# Patient Record
Sex: FEMALE | Race: BLACK OR AFRICAN AMERICAN | Employment: FULL TIME | URBAN - METROPOLITAN AREA
[De-identification: names, ages, dates, MRNs, and addresses within clinical notes are randomized per-mention and may not be internally consistent; named-entity substitution may affect disease eponyms.]

---

## 2020-09-25 NOTE — PROGRESS NOTES
[facility] Family Medicine Office Note  Chief Complaint:   Patient presents with:  Medication Request: BP CHECK, new pt      HPI:   This is a 38year old female coming in for establishment of care for high blood pressure.   Patient states she has loss, blurred vision, double vision or yellow sclerae. Ears, Nose, Throat:  Denies hearing loss, sneezing, congestion, runny nose or sore throat. CARDIOVASCULAR:  Denies chest pain, chest pressure or chest discomfort. No palpitations or edema.   Denies any Future  - NIFEdipine ER osmotic release (PROCARDIA XL) 60 MG Oral Tablet 24 Hr; Take 1 tablet (60 mg total) by mouth daily. Dispense: 30 tablet;  Refill: 0      Meds & Refills for this Visit:  Requested Prescriptions     Signed Prescriptions Disp Refills

## 2020-10-12 NOTE — PROGRESS NOTES
Pt came in for Blood Pressure check per Dr. Mamta Vidal. Per pt she is currently taking Nifedipine 60 mg 1 tab daily. Per pt she no complaints. Also brought her brand new machine for comparison. Please see B/P reads.   Spoke with Dr. Mamta Vidal and given pt's readin

## 2020-10-27 NOTE — PROGRESS NOTES
[facility] Family Medicine Office Note  Chief Complaint:   Patient presents with:  Hypertension      HPI:   This is a 38year old female coming in for recheck of her hypertension.  Pt has been taking medications as instructed, no medication side e pain or stiffness.     EXAM:   /82 (BP Location: Right arm, Patient Position: Sitting, Cuff Size: large)   Pulse 68   Temp 97.1 °F (36.2 °C) (Other)   Resp 20   Ht 67.5\"   Wt 268 lb (121.6 kg)   LMP 09/10/2020 (Exact Date)   BMI 41.36 kg/m²  Estimate with any questions, complications, allergies, or worsening or changing symptoms. Patient is to call with any side effects or complications from the treatments as a result of today. Problem List:  There is no problem list on file for this patient.

## 2021-07-13 NOTE — TELEPHONE ENCOUNTER
Fax received from [address] [place] [address] for CPAP supplies:    PROVIDER: [facility]    DX: G47.33    CPT:    FULL FACE MASK      -- full face mask    -- cushion    -- head gear    -- chin strap    -- tubing    -- filter   A7

## 2021-11-10 PROBLEM — I10 ESSENTIAL HYPERTENSION WITH GOAL BLOOD PRESSURE LESS THAN 130/80: Status: ACTIVE | Noted: 2021-11-10

## 2021-11-10 NOTE — PATIENT INSTRUCTIONS
Prevention Guidelines, Women Ages 36 to 52  Screening tests and vaccines are an important part of managing your health. A screening test is done to find diseases in people who don't have any symptoms.  The goal is to find a disease early so lifestyle calhoun sigmoidoscopy every 5 years, or  · Colonoscopy every 10 years, or  · CT colonography (virtual colonoscopy) every 5 years, or  · Yearly fecal occult blood test, or  · Yearly fecal immunochemical test every year, or  · Stool DNA test, every 3 years  If you c least 4 weeks after the first dose   Hepatitis A Women at increased risk for infection–talk with your healthcare provider 2 doses given 6 months apart   Hepatitis B Women at increased risk for infection–talk with your healthcare provider 3 doses over 6 mon American Academy of Ophthalmology  Shirley last reviewed this educational content on 11/1/2017  © 1402-9815 [employer]. All rights reserved. This information is not intended as a substitute for professional medical care.  Always follow your

## 2021-11-10 NOTE — PROGRESS NOTES
HPI:   Dianne Mckoy is a 42year old female who presents for a complete physical exam. Symptoms: denies discharge, itching, burning or dysuria, periods are regular. Patient complains of nothing today. Patient presents for recheck of her hypertension.  Pt h 162 (H) <100 mg/dL    VLDL 22 0 - 30 mg/dL    Non HDL Chol 184 (H) <130 mg/dL    FASTING Yes    CBC W/ DIFFERENTIAL   Result Value Ref Range    WBC 5.8 4.0 - 11.0 x10(3) uL    RBC 4.06 3.80 - 5.30 x10(6)uL    HGB 11.0 (L) 12.0 - 16.0 g/dL    HCT 34.4 (L) 3 otherwise  SKIN: denies any unusual skin lesions  EYES:denies blurred vision or double vision  HEENT: denies nasal congestion, sinus pain or ST  LUNGS: denies shortness of breath with exertion, denies cough  CARDIOVASCULAR: denies chest pain on exertion or (14)      Lipid Panel      TSH W Reflex To Free T4      Urinalysis with Culture Reflex    HTN - controlled, cpm, check renal function  Advised to have ultrafast, and 5 minute heart aware.   Advised patient to check with insurance company for coverage prior

## 2022-01-19 NOTE — PROGRESS NOTES
[facility] Family Medicine Office Note  Chief Complaint:   Patient presents with:  Pain: right foot pain from chair falling on her foot 12/21/21      HPI:   This is a 42year old female coming in for pain in the right foot.   Patient had a chair f exertion or at rest  RESPIRATORY:  Denies shortness of breath, cough  GASTROINTESTINAL:  Denies any abdominal pain, nausea, vomiting  NEUROLOGICAL:  Denies headache, dizziness, syncope, numbness or tingling in the extremities.   MUSCULOSKELETAL:  + right 4t Years Never done  Pap Smear,3 Years Never done  Influenza Vaccine(1) due on 10/01/2021    Patient/Caregiver Education: Patient/Caregiver Education: There are no barriers to learning. Medical education done. Outcome: Patient verbalizes understanding.  Niurka

## 2022-08-04 NOTE — TELEPHONE ENCOUNTER
Pt saw you in the May for a rash on her left arm and hand. You prescribed her a medrol dose mayo and triamcinolone cream. The rash improved. It is back in the same area left arm and hand and is very itchy. She has no other sx. She was wanting to know if you can prescribe something again or will you need to see this?  ( should she send a picture?)

## 2022-08-04 NOTE — TELEPHONE ENCOUNTER
Pt was seen and treated for rash by Dr. Colleen Land 5/12/2022. Pt states rash returned in June, and is now uncontrollably itchy. Pt states rash has also started on other arm. Pt is concerned it is scabies.

## 2022-08-04 NOTE — TELEPHONE ENCOUNTER
Pt informed of recommendation below and she voiced understanding. Agreed to go to [facility] today.

## 2022-08-04 NOTE — PATIENT INSTRUCTIONS
Start steroid- take with food   Start topical cream twice daily   Clean lightly   Do not scratch.  Oral antihistamines OTC for itch ( benadryl at night, Claritin in the am)    Close follow up with PCP or derm if rash persists, worsens, or reoccurs

## 2022-09-12 NOTE — PATIENT INSTRUCTIONS
For heavy periods:  -try taking 600mg ibuprofen daily for 5 days, starting on the first day of your period (when you have been getting light spotting)

## 2022-09-20 ENCOUNTER — TELEPHONE (OUTPATIENT)
Dept: OBGYN CLINIC | Facility: CLINIC | Age: 42
End: 2022-09-20

## 2022-09-20 DIAGNOSIS — N64.52 NIPPLE DISCHARGE: ICD-10-CM

## 2022-09-20 DIAGNOSIS — N64.52 BLOODY DISCHARGE FROM LEFT NIPPLE: Primary | ICD-10-CM

## 2022-09-20 NOTE — TELEPHONE ENCOUNTER
[facility] mamm[facility] calling to have LT diagnostic mammogram changed to [facility] diagnostic mammogram. They state the patient is due for her mammo in two months and would prefer to have both done now. Asking to have US indicated in order if needed. Order pended as requested and routed to on-call provider for review.

## 2022-09-21 ENCOUNTER — LAB ENCOUNTER (OUTPATIENT)
Dept: LAB | Age: 42
End: 2022-09-21
Attending: STUDENT IN AN ORGANIZED HEALTH CARE EDUCATION/TRAINING PROGRAM

## 2022-09-21 ENCOUNTER — HOSPITAL ENCOUNTER (OUTPATIENT)
Dept: MAMMOGRAPHY | Facility: HOSPITAL | Age: 42
Discharge: HOME | End: 2022-09-21
Attending: STUDENT IN AN ORGANIZED HEALTH CARE EDUCATION/TRAINING PROGRAM

## 2022-09-21 DIAGNOSIS — N64.52 BLOODY DISCHARGE FROM LEFT NIPPLE: ICD-10-CM

## 2022-09-21 DIAGNOSIS — N92.0 MENORRHAGIA WITH REGULAR CYCLE: ICD-10-CM

## 2022-09-21 LAB
BASOPHILS # BLD AUTO: 0.06 X10(3) UL (ref 0–0.2)
BASOPHILS NFR BLD AUTO: 1.1 %
DEPRECATED HBV CORE AB SER IA-ACNC: 3.7 NG/ML
EOSINOPHIL # BLD AUTO: 0.17 X10(3) UL (ref 0–0.7)
EOSINOPHIL NFR BLD AUTO: 3.1 %
ERYTHROCYTE [DISTWIDTH] IN BLOOD BY AUTOMATED COUNT: 17 %
HCT VFR BLD AUTO: 32.8 %
HGB BLD-MCNC: 10.2 G/DL
IMM GRANULOCYTES # BLD AUTO: 0 X10(3) UL (ref 0–1)
IMM GRANULOCYTES NFR BLD: 0 %
IRON SATN MFR SERPL: 6 %
IRON SERPL-MCNC: 27 UG/DL
LYMPHOCYTES # BLD AUTO: 2.82 X10(3) UL (ref 1–4)
LYMPHOCYTES NFR BLD AUTO: 50.9 %
MCH RBC QN AUTO: 25.7 PG (ref 26–34)
MCHC RBC AUTO-ENTMCNC: 31.1 G/DL (ref 31–37)
MCV RBC AUTO: 82.6 FL
MONOCYTES # BLD AUTO: 0.52 X10(3) UL (ref 0.1–1)
MONOCYTES NFR BLD AUTO: 9.4 %
NEUTROPHILS # BLD AUTO: 1.97 X10 (3) UL (ref 1.5–7.7)
NEUTROPHILS # BLD AUTO: 1.97 X10(3) UL (ref 1.5–7.7)
NEUTROPHILS NFR BLD AUTO: 35.5 %
PLATELET # BLD AUTO: 246 10(3)UL (ref 150–450)
RBC # BLD AUTO: 3.97 X10(6)UL
TIBC SERPL-MCNC: 443 UG/DL (ref 240–450)
TRANSFERRIN SERPL-MCNC: 297 MG/DL (ref 200–360)
WBC # BLD AUTO: 5.5 X10(3) UL (ref 4–11)

## 2022-09-21 PROCEDURE — 83540 ASSAY OF IRON: CPT

## 2022-09-21 PROCEDURE — 85025 COMPLETE CBC W/AUTO DIFF WBC: CPT

## 2022-09-21 PROCEDURE — 83550 IRON BINDING TEST: CPT

## 2022-09-21 PROCEDURE — 36415 COLL VENOUS BLD VENIPUNCTURE: CPT

## 2022-09-21 PROCEDURE — 82728 ASSAY OF FERRITIN: CPT

## 2022-09-21 PROCEDURE — 77066 DX MAMMO INCL CAD BI: CPT | Performed by: STUDENT IN AN ORGANIZED HEALTH CARE EDUCATION/TRAINING PROGRAM

## 2022-09-21 PROCEDURE — 76642 ULTRASOUND BREAST LIMITED: CPT | Performed by: STUDENT IN AN ORGANIZED HEALTH CARE EDUCATION/TRAINING PROGRAM

## 2022-09-21 PROCEDURE — 77062 BREAST TOMOSYNTHESIS BI: CPT | Performed by: STUDENT IN AN ORGANIZED HEALTH CARE EDUCATION/TRAINING PROGRAM

## 2022-09-22 ENCOUNTER — TELEPHONE (OUTPATIENT)
Dept: OBGYN CLINIC | Facility: CLINIC | Age: 42
End: 2022-09-22

## 2022-09-22 DIAGNOSIS — N64.52 BLOODY DISCHARGE FROM LEFT NIPPLE: Primary | ICD-10-CM

## 2022-09-22 NOTE — PROGRESS NOTES
Discussed results and recommendation, will need referral place to see Dr. Julianna Garcia, patient verbalized understanding.

## 2022-09-22 NOTE — TELEPHONE ENCOUNTER
Referral # [id number] for Dr. Shania Fernandez initiated for bloody nipple discharge     Referral Notes  Number of Notes: 2    . Type Date User Summary Attachment   ANSI 278 Information 09/22/2022 11:31 AM Julianna Smith ANSI Authorization Response for [facility] HMO -   Note    Request is pending further review and another response will be delivered later.   Referral was marked as Pending Review     Service Information  Action code:  A4-Pended    Decision reason:  10    Trace number:  [id number] (Assigned by: [id number])    Referred to provider:  Joseph Isaac MD    Authorized visits:  1    Admin reference number:  [id number]    Previous review authorization number:  [id number]    Certification issued:  3/98/4839    Certification expires:  0/35/6648    Certification from:  9/22/2022    [address] [place] [address] Pkw[address]

## 2022-09-23 DIAGNOSIS — N92.0 MENORRHAGIA WITH REGULAR CYCLE: Primary | ICD-10-CM

## 2022-09-23 NOTE — PROGRESS NOTES
Pt made aware of lab results and recommendations per Dr. Benitez Rios to begin OTC iron supplements. Pt states she is unable to tolerate oral iron supplements as it \"locks up her GI\"; states she was given IV iron in the past. Pt has not had a cycle since her visit but will attempt the use of NSAIDS prior to the start and notify Dr. ROSADO if HMB does not improve. Please advise if you would like to order IV iron.

## 2022-09-26 ENCOUNTER — TELEPHONE (OUTPATIENT)
Dept: OBGYN CLINIC | Facility: CLINIC | Age: 42
End: 2022-09-26

## 2022-09-26 NOTE — PROGRESS NOTES
Discussed will initiated [facility] for IV iron, once approved will notify pt., scheduled US and EMB. Appt scheduled. Pt verb understanding.

## 2022-10-26 ENCOUNTER — OFFICE VISIT (OUTPATIENT)
Dept: OBGYN CLINIC | Facility: CLINIC | Age: 42
End: 2022-10-26
Payer: COMMERCIAL

## 2022-10-26 VITALS
DIASTOLIC BLOOD PRESSURE: 68 MMHG | BODY MASS INDEX: 44.84 KG/M2 | WEIGHT: 279 LBS | SYSTOLIC BLOOD PRESSURE: 132 MMHG | HEIGHT: 66 IN

## 2022-10-26 DIAGNOSIS — I10 ESSENTIAL HYPERTENSION WITH GOAL BLOOD PRESSURE LESS THAN 130/80: ICD-10-CM

## 2022-10-26 DIAGNOSIS — N92.0 EXCESSIVE OR FREQUENT MENSTRUATION: Primary | ICD-10-CM

## 2022-10-26 DIAGNOSIS — Z01.812 PRE-PROCEDURAL LABORATORY EXAMINATION: ICD-10-CM

## 2022-10-26 LAB
CONTROL LINE PRESENT WITH A CLEAR BACKGROUND (YES/NO): YES YES/NO
PREGNANCY TEST, URINE: NEGATIVE

## 2022-10-26 PROCEDURE — 88305 TISSUE EXAM BY PATHOLOGIST: CPT | Performed by: STUDENT IN AN ORGANIZED HEALTH CARE EDUCATION/TRAINING PROGRAM

## 2022-10-26 PROCEDURE — 3078F DIAST BP <80 MM HG: CPT | Performed by: STUDENT IN AN ORGANIZED HEALTH CARE EDUCATION/TRAINING PROGRAM

## 2022-10-26 PROCEDURE — 58100 BIOPSY OF UTERUS LINING: CPT | Performed by: STUDENT IN AN ORGANIZED HEALTH CARE EDUCATION/TRAINING PROGRAM

## 2022-10-26 PROCEDURE — 3008F BODY MASS INDEX DOCD: CPT | Performed by: STUDENT IN AN ORGANIZED HEALTH CARE EDUCATION/TRAINING PROGRAM

## 2022-10-26 PROCEDURE — 3075F SYST BP GE 130 - 139MM HG: CPT | Performed by: STUDENT IN AN ORGANIZED HEALTH CARE EDUCATION/TRAINING PROGRAM

## 2022-10-26 PROCEDURE — 81025 URINE PREGNANCY TEST: CPT | Performed by: STUDENT IN AN ORGANIZED HEALTH CARE EDUCATION/TRAINING PROGRAM

## 2022-10-26 RX ORDER — KETOCONAZOLE 20 MG/ML
SHAMPOO TOPICAL
COMMUNITY
Start: 2022-10-06

## 2022-10-26 RX ORDER — TACROLIMUS 1 MG/G
1 OINTMENT TOPICAL EVERY 4 HOURS
COMMUNITY
Start: 2022-10-06

## 2022-10-26 RX ORDER — KETOCONAZOLE 20 MG/G
CREAM TOPICAL
COMMUNITY
Start: 2022-10-06

## 2022-10-26 RX ORDER — IBUPROFEN 200 MG
600 TABLET ORAL ONCE
Status: COMPLETED | OUTPATIENT
Start: 2022-10-26 | End: 2022-10-26

## 2022-10-26 RX ADMIN — IBUPROFEN 600 MG: 200 MG TABLET ORAL at 15:31:00

## 2022-10-26 NOTE — PROGRESS NOTES
10/26/2022    Procedure: Endometrial biopsy    Pre-op Dx: menorrhagia    Post-op Dx: Same    Indication: 36year old [place] female with increasingly heavy menses with several risk factors for hyperplasia/EmCa. Menses have been improving with scheduled NSAIDs. Pt not candidate for COCPs. Risks, benefits, alternatives discussed. Speculum placed. Cervix swabbed with betadine x 3   Tenaculum applied to anterior lip of cervix  Cervical canal not stenotic  Pipelle inserted without difficulty to 8 cm  Multiple passes made with moderate tissue obtained. Pipelle & tenaculum removed  Hemostatic tenaculum sites  Speculum removed.    Tolerated well  EBL minimal  Specimen: endometrial biopsy    Jamie Frederick MD

## 2022-10-28 ENCOUNTER — TELEPHONE (OUTPATIENT)
Dept: OBGYN CLINIC | Facility: CLINIC | Age: 42
End: 2022-10-28

## 2022-10-28 RX ORDER — NIFEDIPINE 90 MG/1
90 TABLET, FILM COATED, EXTENDED RELEASE ORAL DAILY
Qty: 30 TABLET | Refills: 0 | Status: SHIPPED | OUTPATIENT
Start: 2022-10-28

## 2022-10-28 NOTE — TELEPHONE ENCOUNTER
Spoke with Natacha Spann at [facility] regarding a closed IV iron referral. She said a new referral needs to be placed for hematology/oncology as an outpt hospital referral New referral placed.

## 2022-11-07 ENCOUNTER — OFFICE VISIT (OUTPATIENT)
Facility: LOCATION | Age: 42
End: 2022-11-07
Payer: COMMERCIAL

## 2022-11-07 VITALS — HEART RATE: 82 BPM | TEMPERATURE: 98 F

## 2022-11-07 DIAGNOSIS — L05.01 PILONIDAL CYST WITH ABSCESS: ICD-10-CM

## 2022-11-07 DIAGNOSIS — N64.52 DISCHARGE FROM LEFT NIPPLE: Primary | ICD-10-CM

## 2022-11-10 ENCOUNTER — OFFICE VISIT (OUTPATIENT)
Dept: FAMILY MEDICINE CLINIC | Facility: CLINIC | Age: 42
End: 2022-11-10
Payer: COMMERCIAL

## 2022-11-10 ENCOUNTER — LAB ENCOUNTER (OUTPATIENT)
Dept: LAB | Age: 42
End: 2022-11-10
Attending: FAMILY MEDICINE
Payer: COMMERCIAL

## 2022-11-10 VITALS
HEART RATE: 84 BPM | HEIGHT: 66 IN | WEIGHT: 275 LBS | SYSTOLIC BLOOD PRESSURE: 134 MMHG | BODY MASS INDEX: 44.2 KG/M2 | DIASTOLIC BLOOD PRESSURE: 86 MMHG | RESPIRATION RATE: 16 BRPM | TEMPERATURE: 97 F

## 2022-11-10 DIAGNOSIS — I10 ESSENTIAL HYPERTENSION WITH GOAL BLOOD PRESSURE LESS THAN 130/80: Primary | ICD-10-CM

## 2022-11-10 DIAGNOSIS — I10 ESSENTIAL HYPERTENSION WITH GOAL BLOOD PRESSURE LESS THAN 130/80: ICD-10-CM

## 2022-11-10 DIAGNOSIS — Z23 NEED FOR VACCINATION: ICD-10-CM

## 2022-11-10 LAB
ALBUMIN SERPL-MCNC: 3.8 G/DL (ref 3.4–5)
ALBUMIN/GLOB SERPL: 1 {RATIO} (ref 1–2)
ALP LIVER SERPL-CCNC: 68 U/L
ALT SERPL-CCNC: 17 U/L
ANION GAP SERPL CALC-SCNC: 6 MMOL/L (ref 0–18)
AST SERPL-CCNC: 10 U/L (ref 15–37)
BILIRUB SERPL-MCNC: 0.4 MG/DL (ref 0.1–2)
BUN BLD-MCNC: 10 MG/DL (ref 7–18)
BUN/CREAT SERPL: 11.9 (ref 10–20)
CALCIUM BLD-MCNC: 9.3 MG/DL (ref 8.5–10.1)
CHLORIDE SERPL-SCNC: 106 MMOL/L (ref 98–112)
CO2 SERPL-SCNC: 24 MMOL/L (ref 21–32)
CREAT BLD-MCNC: 0.84 MG/DL
FASTING STATUS PATIENT QL REPORTED: YES
GFR SERPLBLD BASED ON 1.73 SQ M-ARVRAT: 88 ML/MIN/1.73M2 (ref 60–?)
GLOBULIN PLAS-MCNC: 4 G/DL (ref 2.8–4.4)
GLUCOSE BLD-MCNC: 97 MG/DL (ref 70–99)
OSMOLALITY SERPL CALC.SUM OF ELEC: 281 MOSM/KG (ref 275–295)
POTASSIUM SERPL-SCNC: 4.3 MMOL/L (ref 3.5–5.1)
PROT SERPL-MCNC: 7.8 G/DL (ref 6.4–8.2)
SODIUM SERPL-SCNC: 136 MMOL/L (ref 136–145)

## 2022-11-10 PROCEDURE — 80053 COMPREHEN METABOLIC PANEL: CPT

## 2022-11-10 PROCEDURE — 99213 OFFICE O/P EST LOW 20 MIN: CPT | Performed by: FAMILY MEDICINE

## 2022-11-10 PROCEDURE — 90686 IIV4 VACC NO PRSV 0.5 ML IM: CPT | Performed by: FAMILY MEDICINE

## 2022-11-10 PROCEDURE — 3079F DIAST BP 80-89 MM HG: CPT | Performed by: FAMILY MEDICINE

## 2022-11-10 PROCEDURE — 90471 IMMUNIZATION ADMIN: CPT | Performed by: FAMILY MEDICINE

## 2022-11-10 PROCEDURE — 3075F SYST BP GE 130 - 139MM HG: CPT | Performed by: FAMILY MEDICINE

## 2022-11-10 PROCEDURE — 3008F BODY MASS INDEX DOCD: CPT | Performed by: FAMILY MEDICINE

## 2022-11-10 RX ORDER — NIFEDIPINE 90 MG/1
90 TABLET, FILM COATED, EXTENDED RELEASE ORAL DAILY
Qty: 90 TABLET | Refills: 1 | Status: SHIPPED | OUTPATIENT
Start: 2022-11-10

## 2022-12-02 ENCOUNTER — HOSPITAL ENCOUNTER (OUTPATIENT)
Dept: ULTRASOUND IMAGING | Age: 42
Discharge: HOME | End: 2022-12-02
Attending: STUDENT IN AN ORGANIZED HEALTH CARE EDUCATION/TRAINING PROGRAM
Payer: COMMERCIAL

## 2022-12-02 ENCOUNTER — LAB REQUISITION (OUTPATIENT)
Dept: LAB | Facility: HOSPITAL | Age: 42
End: 2022-12-02
Payer: COMMERCIAL

## 2022-12-02 DIAGNOSIS — N92.0 MENORRHAGIA WITH REGULAR CYCLE: ICD-10-CM

## 2022-12-02 PROCEDURE — 76856 US EXAM PELVIC COMPLETE: CPT | Performed by: STUDENT IN AN ORGANIZED HEALTH CARE EDUCATION/TRAINING PROGRAM

## 2022-12-02 PROCEDURE — 76830 TRANSVAGINAL US NON-OB: CPT | Performed by: STUDENT IN AN ORGANIZED HEALTH CARE EDUCATION/TRAINING PROGRAM

## 2022-12-07 ENCOUNTER — TELEPHONE (OUTPATIENT)
Dept: OBGYN CLINIC | Facility: CLINIC | Age: 42
End: 2022-12-07

## 2022-12-07 NOTE — TELEPHONE ENCOUNTER
Tried to call pt - no answer, phone just rings and rings, could not leave voicemail  Sent [employer] message to patient informing pt re submucosal fibroid, recommendation for hysteroscopy, and to call back to discuss surgery or make follow up appt

## 2022-12-07 NOTE — TELEPHONE ENCOUNTER
----- Message from Medina Timmons RN sent at 12/6/2022  8:59 AM CST -----  Discussed US result, per Dr. Jolie Carr:  [address] shows a likely fibroid, She think this may be the cause of heavy periods. Recommend a hysteroscopy and possible myomectomy. Dr. Jolie Carr would like to discuss procedure with pt. Pt available to receive phone call anytime - will relay to provider. Patient verbalized understanding.

## 2022-12-08 ENCOUNTER — TELEPHONE (OUTPATIENT)
Facility: CLINIC | Age: 42
End: 2022-12-08

## 2022-12-08 NOTE — TELEPHONE ENCOUNTER
Spoke with pt. Discussed recommended hysteroscopy possible myomectomy. Wanted to make sure this would not effect future pregnancies. I told pt it would not effect pregnancies in the future. Procedure & recovery explained to pt. She would like to proceed with scheduling the procedure. I let her know I would send it to our surgery scheduler and we'd call once procedure has been scheduled. Verbalized understanding.

## 2022-12-14 ENCOUNTER — TELEPHONE (OUTPATIENT)
Facility: CLINIC | Age: 42
End: 2022-12-14

## 2022-12-15 ENCOUNTER — TELEPHONE (OUTPATIENT)
Facility: CLINIC | Age: 42
End: 2022-12-15

## 2022-12-15 DIAGNOSIS — Z01.812 PRE-PROCEDURAL LABORATORY EXAMINATION: ICD-10-CM

## 2022-12-15 DIAGNOSIS — N92.0 EXCESSIVE OR FREQUENT MENSTRUATION: Primary | ICD-10-CM

## 2022-12-15 NOTE — TELEPHONE ENCOUNTER
Pt aware surgery has been scheduled for 01/27. Covid order has been placed. Pt verbalized understanding.

## 2022-12-15 NOTE — TELEPHONE ENCOUNTER
Pa was submitted to [facility] for [address] & [id number]. Referral # [id number].

## 2022-12-28 ENCOUNTER — APPOINTMENT (OUTPATIENT)
Dept: CT IMAGING | Facility: HOSPITAL | Age: 42
End: 2022-12-28
Attending: EMERGENCY MEDICINE
Payer: COMMERCIAL

## 2022-12-28 ENCOUNTER — HOSPITAL ENCOUNTER (EMERGENCY)
Facility: HOSPITAL | Age: 42
Discharge: HOME | End: 2022-12-28
Attending: EMERGENCY MEDICINE
Payer: COMMERCIAL

## 2022-12-28 ENCOUNTER — APPOINTMENT (OUTPATIENT)
Dept: GENERAL RADIOLOGY | Facility: HOSPITAL | Age: 42
End: 2022-12-28
Payer: COMMERCIAL

## 2022-12-28 VITALS
SYSTOLIC BLOOD PRESSURE: 125 MMHG | TEMPERATURE: 98 F | HEIGHT: 67 IN | RESPIRATION RATE: 26 BRPM | DIASTOLIC BLOOD PRESSURE: 68 MMHG | WEIGHT: 285 LBS | BODY MASS INDEX: 44.73 KG/M2 | HEART RATE: 77 BPM | OXYGEN SATURATION: 98 %

## 2022-12-28 DIAGNOSIS — R07.81 PLEURITIC CHEST PAIN: Primary | ICD-10-CM

## 2022-12-28 LAB
ALBUMIN SERPL-MCNC: 3.4 G/DL (ref 3.4–5)
ALBUMIN/GLOB SERPL: 0.7 {RATIO} (ref 1–2)
ALP LIVER SERPL-CCNC: 65 U/L
ALT SERPL-CCNC: 18 U/L
ANION GAP SERPL CALC-SCNC: 8 MMOL/L (ref 0–18)
AST SERPL-CCNC: 18 U/L (ref 15–37)
ATRIAL RATE: 90 BPM
BASOPHILS # BLD AUTO: 0.07 X10(3) UL (ref 0–0.2)
BASOPHILS NFR BLD AUTO: 0.9 %
BILIRUB SERPL-MCNC: 0.4 MG/DL (ref 0.1–2)
BUN BLD-MCNC: 16 MG/DL (ref 7–18)
CALCIUM BLD-MCNC: 9.1 MG/DL (ref 8.5–10.1)
CHLORIDE SERPL-SCNC: 106 MMOL/L (ref 98–112)
CO2 SERPL-SCNC: 22 MMOL/L (ref 21–32)
CREAT BLD-MCNC: 1.14 MG/DL
D DIMER PPP FEU-MCNC: 0.67 UG/ML FEU (ref ?–0.5)
EOSINOPHIL # BLD AUTO: 0.11 X10(3) UL (ref 0–0.7)
EOSINOPHIL NFR BLD AUTO: 1.4 %
ERYTHROCYTE [DISTWIDTH] IN BLOOD BY AUTOMATED COUNT: 17.1 %
GFR SERPLBLD BASED ON 1.73 SQ M-ARVRAT: 61 ML/MIN/1.73M2 (ref 60–?)
GLOBULIN PLAS-MCNC: 4.9 G/DL (ref 2.8–4.4)
GLUCOSE BLD-MCNC: 104 MG/DL (ref 70–99)
HCT VFR BLD AUTO: 31.4 %
HGB BLD-MCNC: 10.1 G/DL
IMM GRANULOCYTES # BLD AUTO: 0.01 X10(3) UL (ref 0–1)
IMM GRANULOCYTES NFR BLD: 0.1 %
LYMPHOCYTES # BLD AUTO: 3.12 X10(3) UL (ref 1–4)
LYMPHOCYTES NFR BLD AUTO: 40.5 %
MCH RBC QN AUTO: 26 PG (ref 26–34)
MCHC RBC AUTO-ENTMCNC: 32.2 G/DL (ref 31–37)
MCV RBC AUTO: 80.9 FL
MONOCYTES # BLD AUTO: 0.65 X10(3) UL (ref 0.1–1)
MONOCYTES NFR BLD AUTO: 8.4 %
NEUTROPHILS # BLD AUTO: 3.74 X10 (3) UL (ref 1.5–7.7)
NEUTROPHILS # BLD AUTO: 3.74 X10(3) UL (ref 1.5–7.7)
NEUTROPHILS NFR BLD AUTO: 48.7 %
OSMOLALITY SERPL CALC.SUM OF ELEC: 283 MOSM/KG (ref 275–295)
P AXIS: 32 DEGREES
P-R INTERVAL: 166 MS
PLATELET # BLD AUTO: 274 10(3)UL (ref 150–450)
POTASSIUM SERPL-SCNC: 4 MMOL/L (ref 3.5–5.1)
PROT SERPL-MCNC: 8.3 G/DL (ref 6.4–8.2)
Q-T INTERVAL: 354 MS
QRS DURATION: 82 MS
QTC CALCULATION (BEZET): 433 MS
R AXIS: 85 DEGREES
RBC # BLD AUTO: 3.88 X10(6)UL
SARS-COV-2 RNA RESP QL NAA+PROBE: NOT DETECTED
SODIUM SERPL-SCNC: 136 MMOL/L (ref 136–145)
T AXIS: 18 DEGREES
TROPONIN I HIGH SENSITIVITY: 35 NG/L
TROPONIN I HIGH SENSITIVITY: 37 NG/L
TROPONIN I HIGH SENSITIVITY: 39 NG/L
VENTRICULAR RATE: 90 BPM
WBC # BLD AUTO: 7.7 X10(3) UL (ref 4–11)

## 2022-12-28 PROCEDURE — 85025 COMPLETE CBC W/AUTO DIFF WBC: CPT

## 2022-12-28 PROCEDURE — 84484 ASSAY OF TROPONIN QUANT: CPT

## 2022-12-28 PROCEDURE — 99285 EMERGENCY DEPT VISIT HI MDM: CPT

## 2022-12-28 PROCEDURE — 85025 COMPLETE CBC W/AUTO DIFF WBC: CPT | Performed by: EMERGENCY MEDICINE

## 2022-12-28 PROCEDURE — 80053 COMPREHEN METABOLIC PANEL: CPT

## 2022-12-28 PROCEDURE — 80053 COMPREHEN METABOLIC PANEL: CPT | Performed by: EMERGENCY MEDICINE

## 2022-12-28 PROCEDURE — 84484 ASSAY OF TROPONIN QUANT: CPT | Performed by: EMERGENCY MEDICINE

## 2022-12-28 PROCEDURE — 93005 ELECTROCARDIOGRAM TRACING: CPT

## 2022-12-28 PROCEDURE — 71045 X-RAY EXAM CHEST 1 VIEW: CPT

## 2022-12-28 PROCEDURE — 93010 ELECTROCARDIOGRAM REPORT: CPT

## 2022-12-28 PROCEDURE — 71275 CT ANGIOGRAPHY CHEST: CPT | Performed by: EMERGENCY MEDICINE

## 2022-12-28 PROCEDURE — 85379 FIBRIN DEGRADATION QUANT: CPT

## 2022-12-28 PROCEDURE — 85379 FIBRIN DEGRADATION QUANT: CPT | Performed by: EMERGENCY MEDICINE

## 2022-12-28 PROCEDURE — 36415 COLL VENOUS BLD VENIPUNCTURE: CPT

## 2022-12-29 ENCOUNTER — PATIENT OUTREACH (OUTPATIENT)
Dept: CASE MANAGEMENT | Age: 42
End: 2022-12-29

## 2022-12-29 NOTE — PROGRESS NOTES
1st attempt; pt had recent ED visit, calling to offer PCP f/u apt (dc 12/29)      Dr. Thomas Winters  [address] [facility] [phone]    Line busy     Try back later

## 2023-01-09 ENCOUNTER — OFFICE VISIT (OUTPATIENT)
Dept: FAMILY MEDICINE CLINIC | Facility: CLINIC | Age: 43
End: 2023-01-09
Payer: COMMERCIAL

## 2023-01-09 VITALS
WEIGHT: 283 LBS | RESPIRATION RATE: 14 BRPM | DIASTOLIC BLOOD PRESSURE: 70 MMHG | SYSTOLIC BLOOD PRESSURE: 122 MMHG | BODY MASS INDEX: 44.42 KG/M2 | HEIGHT: 67 IN | HEART RATE: 90 BPM | TEMPERATURE: 98 F

## 2023-01-09 DIAGNOSIS — I10 ESSENTIAL HYPERTENSION WITH GOAL BLOOD PRESSURE LESS THAN 130/80: ICD-10-CM

## 2023-01-09 DIAGNOSIS — R07.81 PLEURITIC CHEST PAIN: Primary | ICD-10-CM

## 2023-01-09 PROCEDURE — 3074F SYST BP LT 130 MM HG: CPT | Performed by: FAMILY MEDICINE

## 2023-01-09 PROCEDURE — 3008F BODY MASS INDEX DOCD: CPT | Performed by: FAMILY MEDICINE

## 2023-01-09 PROCEDURE — 3078F DIAST BP <80 MM HG: CPT | Performed by: FAMILY MEDICINE

## 2023-01-09 PROCEDURE — 99214 OFFICE O/P EST MOD 30 MIN: CPT | Performed by: FAMILY MEDICINE

## 2023-01-09 RX ORDER — TAZAROTENE 1 MG/G
CREAM TOPICAL
COMMUNITY
Start: 2022-12-05

## 2023-01-09 RX ORDER — FLUOCINOLONE ACETONIDE 0.11 MG/ML
OIL TOPICAL
COMMUNITY
Start: 2022-12-02

## 2023-01-23 RX ORDER — IBUPROFEN 600 MG/1
600 TABLET ORAL EVERY 6 HOURS PRN
COMMUNITY

## 2023-01-27 ENCOUNTER — HOSPITAL ENCOUNTER (OUTPATIENT)
Facility: HOSPITAL | Age: 43
Setting detail: HOSPITAL OUTPATIENT SURGERY
Discharge: HOME | End: 2023-01-27
Attending: STUDENT IN AN ORGANIZED HEALTH CARE EDUCATION/TRAINING PROGRAM | Admitting: STUDENT IN AN ORGANIZED HEALTH CARE EDUCATION/TRAINING PROGRAM
Payer: COMMERCIAL

## 2023-01-27 ENCOUNTER — ANESTHESIA (OUTPATIENT)
Dept: SURGERY | Facility: HOSPITAL | Age: 43
End: 2023-01-27
Payer: COMMERCIAL

## 2023-01-27 ENCOUNTER — ANESTHESIA EVENT (OUTPATIENT)
Dept: SURGERY | Facility: HOSPITAL | Age: 43
End: 2023-01-27
Payer: COMMERCIAL

## 2023-01-27 VITALS
DIASTOLIC BLOOD PRESSURE: 65 MMHG | HEART RATE: 72 BPM | OXYGEN SATURATION: 97 % | SYSTOLIC BLOOD PRESSURE: 122 MMHG | WEIGHT: 283 LBS | TEMPERATURE: 97 F | RESPIRATION RATE: 16 BRPM | HEIGHT: 67 IN | BODY MASS INDEX: 44.42 KG/M2

## 2023-01-27 DIAGNOSIS — N92.0 EXCESSIVE OR FREQUENT MENSTRUATION: ICD-10-CM

## 2023-01-27 LAB — B-HCG UR QL: NEGATIVE

## 2023-01-27 PROCEDURE — 0UB98ZZ EXCISION OF UTERUS, VIA NATURAL OR ARTIFICIAL OPENING ENDOSCOPIC: ICD-10-PCS | Performed by: STUDENT IN AN ORGANIZED HEALTH CARE EDUCATION/TRAINING PROGRAM

## 2023-01-27 PROCEDURE — 58561 HYSTEROSCOPY REMOVE MYOMA: CPT | Performed by: STUDENT IN AN ORGANIZED HEALTH CARE EDUCATION/TRAINING PROGRAM

## 2023-01-27 RX ORDER — HYDROMORPHONE HYDROCHLORIDE 1 MG/ML
0.6 INJECTION, SOLUTION INTRAMUSCULAR; INTRAVENOUS; SUBCUTANEOUS EVERY 5 MIN PRN
Status: DISCONTINUED | OUTPATIENT
Start: 2023-01-27 | End: 2023-01-27

## 2023-01-27 RX ORDER — KETOROLAC TROMETHAMINE 30 MG/ML
INJECTION, SOLUTION INTRAMUSCULAR; INTRAVENOUS AS NEEDED
Status: DISCONTINUED | OUTPATIENT
Start: 2023-01-27 | End: 2023-01-27 | Stop reason: SURG

## 2023-01-27 RX ORDER — SODIUM CHLORIDE, SODIUM LACTATE, POTASSIUM CHLORIDE, CALCIUM CHLORIDE 600; 310; 30; 20 MG/100ML; MG/100ML; MG/100ML; MG/100ML
INJECTION, SOLUTION INTRAVENOUS CONTINUOUS
Status: DISCONTINUED | OUTPATIENT
Start: 2023-01-27 | End: 2023-01-27

## 2023-01-27 RX ORDER — NALOXONE HYDROCHLORIDE 0.4 MG/ML
80 INJECTION, SOLUTION INTRAMUSCULAR; INTRAVENOUS; SUBCUTANEOUS AS NEEDED
Status: DISCONTINUED | OUTPATIENT
Start: 2023-01-27 | End: 2023-01-27

## 2023-01-27 RX ORDER — HYDROCODONE BITARTRATE AND ACETAMINOPHEN 5; 325 MG/1; MG/1
1 TABLET ORAL ONCE AS NEEDED
Status: DISCONTINUED | OUTPATIENT
Start: 2023-01-27 | End: 2023-01-27

## 2023-01-27 RX ORDER — ACETAMINOPHEN 500 MG
1000 TABLET ORAL ONCE AS NEEDED
Status: DISCONTINUED | OUTPATIENT
Start: 2023-01-27 | End: 2023-01-27

## 2023-01-27 RX ORDER — ACETAMINOPHEN 500 MG
1000 TABLET ORAL ONCE
Status: DISCONTINUED | OUTPATIENT
Start: 2023-01-27 | End: 2023-01-27 | Stop reason: HOSPADM

## 2023-01-27 RX ORDER — LIDOCAINE HYDROCHLORIDE 10 MG/ML
INJECTION, SOLUTION EPIDURAL; INFILTRATION; INTRACAUDAL; PERINEURAL AS NEEDED
Status: DISCONTINUED | OUTPATIENT
Start: 2023-01-27 | End: 2023-01-27 | Stop reason: SURG

## 2023-01-27 RX ORDER — HYDROCODONE BITARTRATE AND ACETAMINOPHEN 5; 325 MG/1; MG/1
2 TABLET ORAL ONCE AS NEEDED
Status: DISCONTINUED | OUTPATIENT
Start: 2023-01-27 | End: 2023-01-27

## 2023-01-27 RX ORDER — HYDROMORPHONE HYDROCHLORIDE 1 MG/ML
0.4 INJECTION, SOLUTION INTRAMUSCULAR; INTRAVENOUS; SUBCUTANEOUS EVERY 5 MIN PRN
Status: DISCONTINUED | OUTPATIENT
Start: 2023-01-27 | End: 2023-01-27

## 2023-01-27 RX ORDER — ONDANSETRON 2 MG/ML
INJECTION INTRAMUSCULAR; INTRAVENOUS AS NEEDED
Status: DISCONTINUED | OUTPATIENT
Start: 2023-01-27 | End: 2023-01-27 | Stop reason: SURG

## 2023-01-27 RX ORDER — IBUPROFEN 200 MG
600 TABLET ORAL ONCE AS NEEDED
Status: DISCONTINUED | OUTPATIENT
Start: 2023-01-27 | End: 2023-01-27

## 2023-01-27 RX ORDER — PROCHLORPERAZINE EDISYLATE 5 MG/ML
5 INJECTION INTRAMUSCULAR; INTRAVENOUS EVERY 8 HOURS PRN
Status: DISCONTINUED | OUTPATIENT
Start: 2023-01-27 | End: 2023-01-27

## 2023-01-27 RX ORDER — LIDOCAINE HYDROCHLORIDE AND EPINEPHRINE 10; 10 MG/ML; UG/ML
INJECTION, SOLUTION INFILTRATION; PERINEURAL AS NEEDED
Status: DISCONTINUED | OUTPATIENT
Start: 2023-01-27 | End: 2023-01-27 | Stop reason: HOSPADM

## 2023-01-27 RX ORDER — MIDAZOLAM HYDROCHLORIDE 1 MG/ML
INJECTION INTRAMUSCULAR; INTRAVENOUS AS NEEDED
Status: DISCONTINUED | OUTPATIENT
Start: 2023-01-27 | End: 2023-01-27 | Stop reason: SURG

## 2023-01-27 RX ORDER — ONDANSETRON 2 MG/ML
4 INJECTION INTRAMUSCULAR; INTRAVENOUS EVERY 6 HOURS PRN
Status: DISCONTINUED | OUTPATIENT
Start: 2023-01-27 | End: 2023-01-27

## 2023-01-27 RX ORDER — HYDROMORPHONE HYDROCHLORIDE 1 MG/ML
0.2 INJECTION, SOLUTION INTRAMUSCULAR; INTRAVENOUS; SUBCUTANEOUS EVERY 5 MIN PRN
Status: DISCONTINUED | OUTPATIENT
Start: 2023-01-27 | End: 2023-01-27

## 2023-01-27 RX ORDER — SCOLOPAMINE TRANSDERMAL SYSTEM 1 MG/1
1 PATCH, EXTENDED RELEASE TRANSDERMAL ONCE
Status: DISCONTINUED | OUTPATIENT
Start: 2023-01-27 | End: 2023-01-27 | Stop reason: HOSPADM

## 2023-01-27 RX ADMIN — SODIUM CHLORIDE, SODIUM LACTATE, POTASSIUM CHLORIDE, CALCIUM CHLORIDE: 600; 310; 30; 20 INJECTION, SOLUTION INTRAVENOUS at 13:36:00

## 2023-01-27 RX ADMIN — MIDAZOLAM HYDROCHLORIDE 2 MG: 1 INJECTION INTRAMUSCULAR; INTRAVENOUS at 13:29:00

## 2023-01-27 RX ADMIN — ONDANSETRON 4 MG: 2 INJECTION INTRAMUSCULAR; INTRAVENOUS at 13:36:00

## 2023-01-27 RX ADMIN — SODIUM CHLORIDE, SODIUM LACTATE, POTASSIUM CHLORIDE, CALCIUM CHLORIDE: 600; 310; 30; 20 INJECTION, SOLUTION INTRAVENOUS at 14:33:00

## 2023-01-27 RX ADMIN — KETOROLAC TROMETHAMINE 15 MG: 30 INJECTION, SOLUTION INTRAMUSCULAR; INTRAVENOUS at 13:36:00

## 2023-01-27 RX ADMIN — LIDOCAINE HYDROCHLORIDE 25 MG: 10 INJECTION, SOLUTION EPIDURAL; INFILTRATION; INTRACAUDAL; PERINEURAL at 13:34:00

## 2023-01-27 NOTE — DISCHARGE INSTRUCTIONS
Take tylenol 1000 mg every 6 hours and ibuprofen 600 mg every 6 hours as needed for pain. Nothing in the vagina for 2 weeks. No strenuous activity/exercise for 48 hours. No tub baths for 2 weeks. May shower. Call your physician's office if temperature is 100.4 or greater, you have increasing pelvic pain, vaginal bleeding filling more than 1 pad per hour, chest pain, shortness of breath, leg pain or swelling. Call office for any questions or concerns. If you have not already scheduled a follow up appointment, please call the office ([phone]) to schedule an appointment in approximately 2 weeks.       You received a drug called Toradol which is an Anti Inflammatory at: 1:30PM  If you are allowed to take Anti inflammatories (like Motrin, Aleve or Ibuprofen, Advil), do not take any  until after: 7:30PM  Please report any suspected allergic reactions or bleeding issues to your doctor

## 2023-01-27 NOTE — ANESTHESIA POSTPROCEDURE EVALUATION
[address] [facility] Patient Status:  Hospital Outpatient Surgery   Age/Gender 36year old female MRN [id number]   [facility] SURGERY Attending Olga Figueroa MD   Hosp Day # 0 PCP ROWDY CHUNG DO       Anesthesia Post-op Note      Jeevan clear HYSTEROSCOPY DILATION AND CURETTAGE with possible myomectomy    Procedure Summary     Date: 01/27/23 Room / Location: [facility] OR 03 / 1404 [facility] OR    Anesthesia Start: 0859 Anesthesia Stop:     Procedure: Jeevan clear HYSTEROSCOPY DILATION AND CURETTAGE with possible myomectomy (Bilateral: Uterus) Diagnosis:       Excessive or frequent menstruation      (Excessive or frequent menstruation [N92.0])    Surgeons: Olga Figueroa MD Anesthesiologist: Raoul Martines MD    Anesthesia Type: MAC ASA Status: 3          Anesthesia Type: MAC    Vitals Value Taken Time   /48 01/27/23 1433   Temp 98 01/27/23 1433   Pulse 87 01/27/23 1433   Resp 16 01/27/23 1433   SpO2 97 01/27/23 1433       Patient Location: Same Day Surgery    Anesthesia Type: MAC    Airway Patency: patent    Postop Pain Control: adequate    Mental Status: mildly sedated but able to meaningfully participate in the post-anesthesia evaluation    Nausea/Vomiting: none    Cardiopulmonary/Hydration status: stable euvolemic    Complications: no apparent anesthesia related complications    Postop vital signs: stable    Dental Exam: Unchanged from Preop    Patient to be discharged from PACU when criteria met.

## 2023-01-27 NOTE — OPERATIVE REPORT
Operative Report:     Daphne Anna  : 1980     Date of procedure: 23    PRE-OP DIAGNOSIS:   Heavy menstrual bleeding, suspected submucosal fibroid       POST-OP DIAGNOSIS:   same    PROCEDURE(S):   Hysteroscopy, Myomectomy & Dilation and curettage of uterus using Truclear hysteroscopic morcellator     ANESTHESIA:  MAC with paracervical block     SURGEON(S): Lanette Randolph MD     ESTIMATED BLOOD LOSS: 20 mL           DRAINS: None            UTERINE DISTENSION MEDIUM: Normal Saline 0.9%  FLUID DEFICIT: 325 mL     SPECIMENS: Endometrial curettings, submucosal fibroid             COMPLICATIONS:  None apparent     FINDINGS: Normal cervix, no lesions. Anteverted uterus. Endometrium diffusely \"fluffy\" with some more polypoid appearing areas. Bilateral tubal ostia seen. Exophytic lesion at L wall of uterine cavity with dense tissue consistent with submucosal fibroid. Smaller submucosal fibroid at anterior wall. PROCEDURE: This procedure was fully reviewed with the patient and written informed consent was obtained after discussing risks, benefits, indication and alternatives. All questions were answered. The patient was taken to operating room. SCDs were placed. MAC was initiated. She was placed in dorsal lithotomy position. Exam under anesthesia performed. She was prepped and draped in normal sterile fashion. The bladder was drained. A sterile bivalved speculum was placed in the vagina. 2 mL of 1% lidocaine with epinephrine was injected at the anterior lip of the cervix, then a single tooth tenaculum was used to grasp the anterior lip. The remaining 8 mL of local anesthetic was injected at 4 and 8 o'clock to perform a paracervical block. The cervix was gently dilated with hegar dilators to 7 mm. The hysteroscopic system was calibrated. The hysteroscope was gently advanced into the endometrial cavity. The uterine cavity was visualized and the above findings noted.  The TruClear Mini dense tissue hysteroscopic blade was then advanced through the hysteroscope under direct visualization to resect the submucosal fibroids. The Truclear mini soft tissue blade was then used to systematically curette the entire endometrial surface. The tissue was sent to pathology. The base of the left fibroid \"stalk\" was bleeding throughout the procedure, by the end of the procedure had slowed but was still oozing     The hysteroscope was then removed from the uterus. The single tooth tenaculum was removed and good hemostasis was noted. There was still oozing from the os, but the cervix was observed for some time and the oozing was slow, did not fill the speculum - similar to menstrual bleeding, so the procedure was concluded. Sponge, lap, needle, and instrument counts correct by two counts. The patient was taken to recovery room in stable condition.      DISPOSITION:  To recovery room in stable condition            MD ROCIO Dawson - MERRITT

## 2023-02-14 ENCOUNTER — OFFICE VISIT (OUTPATIENT)
Facility: CLINIC | Age: 43
End: 2023-02-14
Payer: COMMERCIAL

## 2023-02-14 VITALS
SYSTOLIC BLOOD PRESSURE: 128 MMHG | BODY MASS INDEX: 45 KG/M2 | WEIGHT: 285 LBS | HEART RATE: 97 BPM | DIASTOLIC BLOOD PRESSURE: 72 MMHG

## 2023-02-14 DIAGNOSIS — Z09 POSTOPERATIVE EXAMINATION: Primary | ICD-10-CM

## 2023-02-14 PROCEDURE — 99024 POSTOP FOLLOW-UP VISIT: CPT | Performed by: STUDENT IN AN ORGANIZED HEALTH CARE EDUCATION/TRAINING PROGRAM

## 2023-02-14 PROCEDURE — 3008F BODY MASS INDEX DOCD: CPT | Performed by: STUDENT IN AN ORGANIZED HEALTH CARE EDUCATION/TRAINING PROGRAM

## 2023-02-14 PROCEDURE — 3078F DIAST BP <80 MM HG: CPT | Performed by: STUDENT IN AN ORGANIZED HEALTH CARE EDUCATION/TRAINING PROGRAM

## 2023-02-14 PROCEDURE — 3074F SYST BP LT 130 MM HG: CPT | Performed by: STUDENT IN AN ORGANIZED HEALTH CARE EDUCATION/TRAINING PROGRAM

## 2023-06-16 DIAGNOSIS — I10 ESSENTIAL HYPERTENSION WITH GOAL BLOOD PRESSURE LESS THAN 130/80: ICD-10-CM

## 2023-06-16 RX ORDER — NIFEDIPINE 90 MG/1
90 TABLET, FILM COATED, EXTENDED RELEASE ORAL DAILY
Qty: 90 TABLET | Refills: 0 | Status: SHIPPED | OUTPATIENT
Start: 2023-06-16

## 2023-06-16 NOTE — TELEPHONE ENCOUNTER
Requesting refill of NIFEdipine ER 90 MG Oral Tablet 24 Hr to [facility][id number] - [place] [address], [address] AT [facility] OF  [address], [phone], [phone]. Pt states [facility] has been requesting for 2 days w/ no response. Pt will be leaving out of country Monday 6[place]19, only has a few days left of medication. Please send rx ASAP if appropriate, thank you!

## 2023-07-12 ENCOUNTER — OFFICE VISIT (OUTPATIENT)
Dept: FAMILY MEDICINE CLINIC | Facility: CLINIC | Age: 43
End: 2023-07-12
Payer: COMMERCIAL

## 2023-07-12 ENCOUNTER — LAB ENCOUNTER (OUTPATIENT)
Dept: LAB | Age: 43
End: 2023-07-12
Attending: FAMILY MEDICINE
Payer: COMMERCIAL

## 2023-07-12 VITALS
TEMPERATURE: 98 F | RESPIRATION RATE: 14 BRPM | HEIGHT: 67 IN | DIASTOLIC BLOOD PRESSURE: 70 MMHG | WEIGHT: 278 LBS | SYSTOLIC BLOOD PRESSURE: 138 MMHG | BODY MASS INDEX: 43.63 KG/M2 | HEART RATE: 72 BPM

## 2023-07-12 DIAGNOSIS — I10 ESSENTIAL HYPERTENSION WITH GOAL BLOOD PRESSURE LESS THAN 130/80: ICD-10-CM

## 2023-07-12 DIAGNOSIS — M70.61 GREATER TROCHANTERIC BURSITIS OF RIGHT HIP: Primary | ICD-10-CM

## 2023-07-12 LAB
ALBUMIN SERPL-MCNC: 3.8 G/DL (ref 3.4–5)
ALBUMIN/GLOB SERPL: 0.7 {RATIO} (ref 1–2)
ALP LIVER SERPL-CCNC: 72 U/L
ALT SERPL-CCNC: 24 U/L
ANION GAP SERPL CALC-SCNC: 9 MMOL/L (ref 0–18)
AST SERPL-CCNC: 21 U/L (ref 15–37)
BILIRUB SERPL-MCNC: 0.4 MG/DL (ref 0.1–2)
BUN BLD-MCNC: 13 MG/DL (ref 7–18)
CALCIUM BLD-MCNC: 9.3 MG/DL (ref 8.5–10.1)
CHLORIDE SERPL-SCNC: 103 MMOL/L (ref 98–112)
CO2 SERPL-SCNC: 23 MMOL/L (ref 21–32)
CREAT BLD-MCNC: 0.84 MG/DL
FASTING STATUS PATIENT QL REPORTED: NO
GFR SERPLBLD BASED ON 1.73 SQ M-ARVRAT: 88 ML/MIN/1.73M2 (ref 60–?)
GLOBULIN PLAS-MCNC: 5.3 G/DL (ref 2.8–4.4)
GLUCOSE BLD-MCNC: 89 MG/DL (ref 70–99)
OSMOLALITY SERPL CALC.SUM OF ELEC: 280 MOSM/KG (ref 275–295)
POTASSIUM SERPL-SCNC: 3.9 MMOL/L (ref 3.5–5.1)
PROT SERPL-MCNC: 9.1 G/DL (ref 6.4–8.2)
SODIUM SERPL-SCNC: 135 MMOL/L (ref 136–145)

## 2023-07-12 PROCEDURE — 3075F SYST BP GE 130 - 139MM HG: CPT | Performed by: FAMILY MEDICINE

## 2023-07-12 PROCEDURE — 99214 OFFICE O/P EST MOD 30 MIN: CPT | Performed by: FAMILY MEDICINE

## 2023-07-12 PROCEDURE — 3078F DIAST BP <80 MM HG: CPT | Performed by: FAMILY MEDICINE

## 2023-07-12 PROCEDURE — 3008F BODY MASS INDEX DOCD: CPT | Performed by: FAMILY MEDICINE

## 2023-07-12 PROCEDURE — 80053 COMPREHEN METABOLIC PANEL: CPT

## 2023-07-12 RX ORDER — ETODOLAC 400 MG/1
400 TABLET, FILM COATED ORAL 2 TIMES DAILY
Qty: 28 TABLET | Refills: 0 | Status: SHIPPED | OUTPATIENT
Start: 2023-07-12 | End: 2023-07-26

## 2023-09-22 DIAGNOSIS — I10 ESSENTIAL HYPERTENSION WITH GOAL BLOOD PRESSURE LESS THAN 130/80: ICD-10-CM

## 2023-09-22 RX ORDER — NIFEDIPINE 90 MG/1
90 TABLET, FILM COATED, EXTENDED RELEASE ORAL DAILY
Qty: 90 TABLET | Refills: 0 | Status: SHIPPED | OUTPATIENT
Start: 2023-09-22

## 2023-11-10 ENCOUNTER — OFFICE VISIT (OUTPATIENT)
Dept: FAMILY MEDICINE CLINIC | Facility: CLINIC | Age: 43
End: 2023-11-10
Payer: COMMERCIAL

## 2023-11-10 VITALS
OXYGEN SATURATION: 98 % | WEIGHT: 284 LBS | RESPIRATION RATE: 18 BRPM | BODY MASS INDEX: 44.57 KG/M2 | SYSTOLIC BLOOD PRESSURE: 136 MMHG | DIASTOLIC BLOOD PRESSURE: 80 MMHG | HEART RATE: 88 BPM | HEIGHT: 67 IN

## 2023-11-10 DIAGNOSIS — N63.23 MASS OF LOWER OUTER QUADRANT OF LEFT BREAST: ICD-10-CM

## 2023-11-10 DIAGNOSIS — Z80.3 FAMILY HISTORY OF BREAST CANCER IN FEMALE: ICD-10-CM

## 2023-11-10 DIAGNOSIS — I10 ESSENTIAL HYPERTENSION WITH GOAL BLOOD PRESSURE LESS THAN 130/80: Primary | ICD-10-CM

## 2023-11-10 PROCEDURE — 3079F DIAST BP 80-89 MM HG: CPT | Performed by: FAMILY MEDICINE

## 2023-11-10 PROCEDURE — 3008F BODY MASS INDEX DOCD: CPT | Performed by: FAMILY MEDICINE

## 2023-11-10 PROCEDURE — 99214 OFFICE O/P EST MOD 30 MIN: CPT | Performed by: FAMILY MEDICINE

## 2023-11-10 PROCEDURE — 3075F SYST BP GE 130 - 139MM HG: CPT | Performed by: FAMILY MEDICINE

## 2023-11-10 RX ORDER — NIFEDIPINE 90 MG/1
90 TABLET, FILM COATED, EXTENDED RELEASE ORAL DAILY
Qty: 90 TABLET | Refills: 1 | Status: SHIPPED | OUTPATIENT
Start: 2023-11-10

## 2023-11-21 ENCOUNTER — HOSPITAL ENCOUNTER (OUTPATIENT)
Dept: ULTRASOUND IMAGING | Age: 43
Discharge: HOME | End: 2023-11-21
Attending: FAMILY MEDICINE
Payer: COMMERCIAL

## 2023-11-21 ENCOUNTER — HOSPITAL ENCOUNTER (OUTPATIENT)
Dept: MAMMOGRAPHY | Age: 43
Discharge: HOME | End: 2023-11-21
Attending: FAMILY MEDICINE
Payer: COMMERCIAL

## 2023-11-21 DIAGNOSIS — N63.23 MASS OF LOWER OUTER QUADRANT OF LEFT BREAST: ICD-10-CM

## 2023-11-21 PROCEDURE — 77062 BREAST TOMOSYNTHESIS BI: CPT | Performed by: FAMILY MEDICINE

## 2023-11-21 PROCEDURE — 77066 DX MAMMO INCL CAD BI: CPT | Performed by: FAMILY MEDICINE

## 2023-11-21 PROCEDURE — 76642 ULTRASOUND BREAST LIMITED: CPT | Performed by: FAMILY MEDICINE

## 2023-11-27 ENCOUNTER — OFFICE VISIT (OUTPATIENT)
Dept: FAMILY MEDICINE CLINIC | Facility: CLINIC | Age: 43
End: 2023-11-27
Payer: COMMERCIAL

## 2023-11-27 VITALS
HEIGHT: 67 IN | RESPIRATION RATE: 18 BRPM | HEART RATE: 102 BPM | OXYGEN SATURATION: 98 % | DIASTOLIC BLOOD PRESSURE: 60 MMHG | SYSTOLIC BLOOD PRESSURE: 130 MMHG | BODY MASS INDEX: 45.52 KG/M2 | WEIGHT: 290 LBS

## 2023-11-27 DIAGNOSIS — N61.1 ABSCESS OF LEFT BREAST: Primary | ICD-10-CM

## 2023-11-27 RX ORDER — SULFAMETHOXAZOLE AND TRIMETHOPRIM 800; 160 MG/1; MG/1
1 TABLET ORAL 2 TIMES DAILY
Qty: 20 TABLET | Refills: 0 | Status: SHIPPED | OUTPATIENT
Start: 2023-11-27 | End: 2023-12-07

## 2023-11-27 NOTE — PROCEDURES
After obtaining written consent and discussing risks/benefits of procedure, area around the abscess was cleaned with alcohol swab. 2 cc of 2% lidocaine with epinephrine were used for local anesthetic. Small incision was made using 11 blade scalpel. White/yellow was exudate was removed from the abscess. 4 x 4 gauze was placed as bandage. Patient was placed on Bactrim DS twice daily x 10 days. Patient tolerated procedure well. No complications were noted.

## 2023-11-29 ENCOUNTER — MED REC SCAN ONLY (OUTPATIENT)
Dept: FAMILY MEDICINE CLINIC | Facility: CLINIC | Age: 43
End: 2023-11-29

## 2023-12-18 ENCOUNTER — GENETICS ENCOUNTER (OUTPATIENT)
Dept: GENETICS | Facility: HOSPITAL | Age: 43
End: 2023-12-18
Attending: GENETIC COUNSELOR, MS
Payer: COMMERCIAL

## 2023-12-18 ENCOUNTER — OFFICE VISIT (OUTPATIENT)
Dept: FAMILY MEDICINE CLINIC | Facility: CLINIC | Age: 43
End: 2023-12-18
Payer: COMMERCIAL

## 2023-12-18 ENCOUNTER — NURSE ONLY (OUTPATIENT)
Dept: HEMATOLOGY/ONCOLOGY | Facility: HOSPITAL | Age: 43
End: 2023-12-18
Attending: GENETIC COUNSELOR, MS
Payer: COMMERCIAL

## 2023-12-18 VITALS
WEIGHT: 286 LBS | RESPIRATION RATE: 16 BRPM | TEMPERATURE: 97 F | HEIGHT: 67 IN | BODY MASS INDEX: 44.89 KG/M2 | DIASTOLIC BLOOD PRESSURE: 72 MMHG | HEART RATE: 96 BPM | SYSTOLIC BLOOD PRESSURE: 138 MMHG

## 2023-12-18 DIAGNOSIS — Z80.42 FAMILY HISTORY OF PROSTATE CANCER: ICD-10-CM

## 2023-12-18 DIAGNOSIS — Z80.3 FAMILY HISTORY OF BREAST CANCER: Primary | ICD-10-CM

## 2023-12-18 DIAGNOSIS — Z80.9 FAMILY HISTORY OF CANCER: ICD-10-CM

## 2023-12-18 DIAGNOSIS — J22 ACUTE LOWER RESPIRATORY INFECTION: Primary | ICD-10-CM

## 2023-12-18 DIAGNOSIS — L05.01 PILONIDAL CYST WITH ABSCESS: ICD-10-CM

## 2023-12-18 PROCEDURE — 3078F DIAST BP <80 MM HG: CPT | Performed by: FAMILY MEDICINE

## 2023-12-18 PROCEDURE — 3008F BODY MASS INDEX DOCD: CPT | Performed by: FAMILY MEDICINE

## 2023-12-18 PROCEDURE — 36415 COLL VENOUS BLD VENIPUNCTURE: CPT

## 2023-12-18 PROCEDURE — 99214 OFFICE O/P EST MOD 30 MIN: CPT | Performed by: FAMILY MEDICINE

## 2023-12-18 PROCEDURE — 3075F SYST BP GE 130 - 139MM HG: CPT | Performed by: FAMILY MEDICINE

## 2023-12-18 PROCEDURE — 96040 HC GENETIC COUNSELING EA 30 MIN: CPT | Performed by: GENETIC COUNSELOR, MS

## 2023-12-18 RX ORDER — AZITHROMYCIN 250 MG/1
TABLET, FILM COATED ORAL
Qty: 6 TABLET | Refills: 0 | Status: SHIPPED | OUTPATIENT
Start: 2023-12-18 | End: 2023-12-22

## 2023-12-18 RX ORDER — PREDNISONE 20 MG/1
60 TABLET ORAL DAILY
Qty: 15 TABLET | Refills: 0 | Status: SHIPPED | OUTPATIENT
Start: 2023-12-18 | End: 2023-12-23

## 2023-12-19 ENCOUNTER — OFFICE VISIT (OUTPATIENT)
Facility: LOCATION | Age: 43
End: 2023-12-19
Payer: COMMERCIAL

## 2023-12-19 VITALS — HEART RATE: 92 BPM | TEMPERATURE: 98 F

## 2023-12-19 DIAGNOSIS — L05.91 PILONIDAL CYST: Primary | ICD-10-CM

## 2023-12-29 RX ORDER — ACETAMINOPHEN 160 MG
2000 TABLET,DISINTEGRATING ORAL DAILY
COMMUNITY

## 2023-12-29 RX ORDER — MULTIVITAMIN WITH IRON
250 TABLET ORAL DAILY
COMMUNITY

## 2024-01-03 ENCOUNTER — GENETICS ENCOUNTER (OUTPATIENT)
Dept: HEMATOLOGY/ONCOLOGY | Facility: HOSPITAL | Age: 44
End: 2024-01-03

## 2024-01-03 DIAGNOSIS — Z13.71 BRCA GENE MUTATION NEGATIVE IN FEMALE: Primary | ICD-10-CM

## 2024-01-03 NOTE — PROGRESS NOTES
Patient Name: Lita Hernandes  YOB: 1980    Referring Provider:  Thang Henderson DO      Reason for Referral:  Ms. Hernandes had genetic testing performed on 12/18/2023 because of a family history of breast and other cancers.      Genetic Testing Result:  Negative for pathogenic variants. One variant of uncertain significance identified. No pathogenic variant was found in the following 48 genes on the [facility] BRCA1/2 panel and common hereditary cancers + RNA panel: APC*, ALINA*, AXIN2, BAP1, BARD1, BMPR1A, BRCA1, BRCA2, BRIP1, CDH1, CDK4, CDKN2A (p14ARF), CDKN2A (x81EQE6c), CHEK2, CTNNA1, DICER1*, EPCAM*, FH*, GREM1*, HOXB13, KIT, MBD4, MEN1*, MLH1*, MSH2*, MSH3*, MSH6*, MUTYH, NF1*, NTHL1, PALB2, PDGFRA, PMS2*, POLD1*, POLE, PTEN*, RAD51C, RAD51D, SDHA*, SDHB, SDHC*, SDHD, SMAD4, SMARCA4, STK11, TP53, TSC1*, TSC2, VHL. 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  Please refer to the report from [facility] for additional testing information.  These results were discussed with Ms. Hernandes via telephone on 1/3/2024.    Summary and Plan:   These results indicate it is unlikely that Ms. Hernandes has a pathogenic variant (harmful genetic mutation) in any of the genes listed above. No pathogenic variants associated with hereditary cancer syndromes were identified. The etiology Ms. Hernandes's family history of cancer remains genetically unexplained.     Ms. 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 A variant of uncertain significance (VUS) means that a genetic variant has been identified in a cancer susceptibility gene; however, it is currently uncertain if the variant is pathogenic (harmful) or benign (harmless).  With time, the variant may be reclassified as either harmful or harmless, most VUS's end up being reclassified as harmless variants. No changes in management or testing of family members is recommended based on a  VUS result. The limitations of the testing were discussed with Ms. Hernandes including the chance that a pathogenic variant in a gene other than those included in this analysis might be the cause of cancer in Ms. Hernandes or in relatives.     Medical management and surveillance for Ms. Hernandes and other family members should be based on their personal and family history. All medical management decisions should be made with a physician.     I encouraged Ms. Hernandes to share her genetic test results with her relatives so that they may discuss the implications of this information with their health providers. Ms. Hernandes is also encouraged to contact me on an annual basis to learn if there have been any updates in genetic information that would apply or changes in the personal and/or family history. Please do not hesitate to contact my office if you have any questions or concerns, [phone].     Ivis Olguin MS, CGC

## 2024-01-06 ENCOUNTER — LABORATORY ENCOUNTER (OUTPATIENT)
Dept: LAB | Age: 44
End: 2024-01-06
Attending: SURGERY
Payer: COMMERCIAL

## 2024-01-06 ENCOUNTER — EKG ENCOUNTER (OUTPATIENT)
Dept: LAB | Age: 44
End: 2024-01-06
Attending: SURGERY
Payer: COMMERCIAL

## 2024-01-06 DIAGNOSIS — L05.91 PILONIDAL CYST: ICD-10-CM

## 2024-01-06 LAB
ANION GAP SERPL CALC-SCNC: 5 MMOL/L (ref 0–18)
ATRIAL RATE: 70 BPM
BUN BLD-MCNC: 15 MG/DL (ref 9–23)
CALCIUM BLD-MCNC: 8.9 MG/DL (ref 8.5–10.1)
CHLORIDE SERPL-SCNC: 110 MMOL/L (ref 98–112)
CO2 SERPL-SCNC: 25 MMOL/L (ref 21–32)
CREAT BLD-MCNC: 0.89 MG/DL
EGFRCR SERPLBLD CKD-EPI 2021: 82 ML/MIN/1.73M2 (ref 60–?)
FASTING STATUS PATIENT QL REPORTED: NO
GLUCOSE BLD-MCNC: 105 MG/DL (ref 70–99)
OSMOLALITY SERPL CALC.SUM OF ELEC: 291 MOSM/KG (ref 275–295)
P AXIS: 40 DEGREES
P-R INTERVAL: 184 MS
POTASSIUM SERPL-SCNC: 3.7 MMOL/L (ref 3.5–5.1)
Q-T INTERVAL: 388 MS
QRS DURATION: 94 MS
QTC CALCULATION (BEZET): 419 MS
R AXIS: 76 DEGREES
SODIUM SERPL-SCNC: 140 MMOL/L (ref 136–145)
T AXIS: 21 DEGREES
VENTRICULAR RATE: 70 BPM

## 2024-01-06 PROCEDURE — 93010 ELECTROCARDIOGRAM REPORT: CPT | Performed by: INTERNAL MEDICINE

## 2024-01-06 PROCEDURE — 80048 BASIC METABOLIC PNL TOTAL CA: CPT

## 2024-01-06 PROCEDURE — 36415 COLL VENOUS BLD VENIPUNCTURE: CPT

## 2024-01-06 PROCEDURE — 93005 ELECTROCARDIOGRAM TRACING: CPT

## 2024-01-31 ENCOUNTER — ANESTHESIA EVENT (OUTPATIENT)
Dept: SURGERY | Facility: HOSPITAL | Age: 44
End: 2024-01-31
Payer: COMMERCIAL

## 2024-01-31 ENCOUNTER — ANESTHESIA (OUTPATIENT)
Dept: SURGERY | Facility: HOSPITAL | Age: 44
End: 2024-01-31
Payer: COMMERCIAL

## 2024-01-31 ENCOUNTER — HOSPITAL ENCOUNTER (OUTPATIENT)
Facility: HOSPITAL | Age: 44
Setting detail: HOSPITAL OUTPATIENT SURGERY
Discharge: HOME | End: 2024-01-31
Attending: SURGERY | Admitting: SURGERY
Payer: COMMERCIAL

## 2024-01-31 VITALS
DIASTOLIC BLOOD PRESSURE: 82 MMHG | HEART RATE: 81 BPM | WEIGHT: 288.81 LBS | SYSTOLIC BLOOD PRESSURE: 139 MMHG | OXYGEN SATURATION: 98 % | RESPIRATION RATE: 18 BRPM | BODY MASS INDEX: 45.33 KG/M2 | HEIGHT: 67 IN | TEMPERATURE: 98 F

## 2024-01-31 DIAGNOSIS — L05.91 PILONIDAL CYST: Primary | ICD-10-CM

## 2024-01-31 LAB — B-HCG UR QL: NEGATIVE

## 2024-01-31 PROCEDURE — 0JB90ZZ EXCISION OF BUTTOCK SUBCUTANEOUS TISSUE AND FASCIA, OPEN APPROACH: ICD-10-PCS | Performed by: SURGERY

## 2024-01-31 PROCEDURE — 81025 URINE PREGNANCY TEST: CPT

## 2024-01-31 RX ORDER — HYDROCODONE BITARTRATE AND ACETAMINOPHEN 5; 325 MG/1; MG/1
1 TABLET ORAL ONCE AS NEEDED
Status: DISCONTINUED | OUTPATIENT
Start: 2024-01-31 | End: 2024-01-31

## 2024-01-31 RX ORDER — HYDROMORPHONE HYDROCHLORIDE 1 MG/ML
0.6 INJECTION, SOLUTION INTRAMUSCULAR; INTRAVENOUS; SUBCUTANEOUS EVERY 5 MIN PRN
Status: DISCONTINUED | OUTPATIENT
Start: 2024-01-31 | End: 2024-01-31

## 2024-01-31 RX ORDER — PROCHLORPERAZINE EDISYLATE 5 MG/ML
5 INJECTION INTRAMUSCULAR; INTRAVENOUS EVERY 8 HOURS PRN
Status: DISCONTINUED | OUTPATIENT
Start: 2024-01-31 | End: 2024-01-31

## 2024-01-31 RX ORDER — DEXAMETHASONE SODIUM PHOSPHATE 4 MG/ML
VIAL (ML) INJECTION AS NEEDED
Status: DISCONTINUED | OUTPATIENT
Start: 2024-01-31 | End: 2024-01-31 | Stop reason: SURG

## 2024-01-31 RX ORDER — ONDANSETRON 2 MG/ML
INJECTION INTRAMUSCULAR; INTRAVENOUS AS NEEDED
Status: DISCONTINUED | OUTPATIENT
Start: 2024-01-31 | End: 2024-01-31 | Stop reason: SURG

## 2024-01-31 RX ORDER — ONDANSETRON 2 MG/ML
4 INJECTION INTRAMUSCULAR; INTRAVENOUS EVERY 6 HOURS PRN
Status: DISCONTINUED | OUTPATIENT
Start: 2024-01-31 | End: 2024-01-31

## 2024-01-31 RX ORDER — HYDROCODONE BITARTRATE AND ACETAMINOPHEN 5; 325 MG/1; MG/1
2 TABLET ORAL ONCE AS NEEDED
Status: DISCONTINUED | OUTPATIENT
Start: 2024-01-31 | End: 2024-01-31

## 2024-01-31 RX ORDER — GLYCOPYRROLATE 0.2 MG/ML
INJECTION, SOLUTION INTRAMUSCULAR; INTRAVENOUS AS NEEDED
Status: DISCONTINUED | OUTPATIENT
Start: 2024-01-31 | End: 2024-01-31 | Stop reason: SURG

## 2024-01-31 RX ORDER — SODIUM CHLORIDE, SODIUM LACTATE, POTASSIUM CHLORIDE, CALCIUM CHLORIDE 600; 310; 30; 20 MG/100ML; MG/100ML; MG/100ML; MG/100ML
INJECTION, SOLUTION INTRAVENOUS CONTINUOUS
Status: DISCONTINUED | OUTPATIENT
Start: 2024-01-31 | End: 2024-01-31

## 2024-01-31 RX ORDER — ACETAMINOPHEN 500 MG
1000 TABLET ORAL ONCE AS NEEDED
Status: DISCONTINUED | OUTPATIENT
Start: 2024-01-31 | End: 2024-01-31

## 2024-01-31 RX ORDER — NEOSTIGMINE METHYLSULFATE 1 MG/ML
INJECTION, SOLUTION INTRAVENOUS AS NEEDED
Status: DISCONTINUED | OUTPATIENT
Start: 2024-01-31 | End: 2024-01-31 | Stop reason: SURG

## 2024-01-31 RX ORDER — METOCLOPRAMIDE HYDROCHLORIDE 5 MG/ML
INJECTION INTRAMUSCULAR; INTRAVENOUS AS NEEDED
Status: DISCONTINUED | OUTPATIENT
Start: 2024-01-31 | End: 2024-01-31 | Stop reason: SURG

## 2024-01-31 RX ORDER — ALBUTEROL SULFATE 2.5 MG/3ML
2.5 SOLUTION RESPIRATORY (INHALATION) AS NEEDED
Status: DISCONTINUED | OUTPATIENT
Start: 2024-01-31 | End: 2024-01-31

## 2024-01-31 RX ORDER — ALBUTEROL SULFATE 2.5 MG/3ML
SOLUTION RESPIRATORY (INHALATION)
Status: COMPLETED
Start: 2024-01-31 | End: 2024-01-31

## 2024-01-31 RX ORDER — HYDROMORPHONE HYDROCHLORIDE 1 MG/ML
0.2 INJECTION, SOLUTION INTRAMUSCULAR; INTRAVENOUS; SUBCUTANEOUS EVERY 5 MIN PRN
Status: DISCONTINUED | OUTPATIENT
Start: 2024-01-31 | End: 2024-01-31

## 2024-01-31 RX ORDER — HYDROMORPHONE HYDROCHLORIDE 1 MG/ML
0.4 INJECTION, SOLUTION INTRAMUSCULAR; INTRAVENOUS; SUBCUTANEOUS EVERY 5 MIN PRN
Status: DISCONTINUED | OUTPATIENT
Start: 2024-01-31 | End: 2024-01-31

## 2024-01-31 RX ORDER — HYDROCODONE BITARTRATE AND ACETAMINOPHEN 5; 325 MG/1; MG/1
1 TABLET ORAL EVERY 6 HOURS PRN
Qty: 15 TABLET | Refills: 0 | Status: SHIPPED | OUTPATIENT
Start: 2024-01-31

## 2024-01-31 RX ORDER — CEFAZOLIN SODIUM IN 0.9 % NACL 3 G/100 ML
3 INTRAVENOUS SOLUTION, PIGGYBACK (ML) INTRAVENOUS ONCE
Status: COMPLETED | OUTPATIENT
Start: 2024-01-31 | End: 2024-01-31

## 2024-01-31 RX ORDER — SCOLOPAMINE TRANSDERMAL SYSTEM 1 MG/1
1 PATCH, EXTENDED RELEASE TRANSDERMAL ONCE
Status: DISCONTINUED | OUTPATIENT
Start: 2024-01-31 | End: 2024-01-31 | Stop reason: HOSPADM

## 2024-01-31 RX ORDER — HEPARIN SODIUM 5000 [USP'U]/ML
5000 INJECTION, SOLUTION INTRAVENOUS; SUBCUTANEOUS ONCE
Status: COMPLETED | OUTPATIENT
Start: 2024-01-31 | End: 2024-01-31

## 2024-01-31 RX ORDER — ACETAMINOPHEN 500 MG
1000 TABLET ORAL ONCE
Status: DISCONTINUED | OUTPATIENT
Start: 2024-01-31 | End: 2024-01-31 | Stop reason: HOSPADM

## 2024-01-31 RX ORDER — NALOXONE HYDROCHLORIDE 0.4 MG/ML
80 INJECTION, SOLUTION INTRAMUSCULAR; INTRAVENOUS; SUBCUTANEOUS AS NEEDED
Status: DISCONTINUED | OUTPATIENT
Start: 2024-01-31 | End: 2024-01-31

## 2024-01-31 RX ORDER — LABETALOL HYDROCHLORIDE 5 MG/ML
5 INJECTION, SOLUTION INTRAVENOUS EVERY 5 MIN PRN
Status: DISCONTINUED | OUTPATIENT
Start: 2024-01-31 | End: 2024-01-31

## 2024-01-31 RX ORDER — BUPIVACAINE HYDROCHLORIDE AND EPINEPHRINE 5; 5 MG/ML; UG/ML
INJECTION, SOLUTION EPIDURAL; INTRACAUDAL; PERINEURAL AS NEEDED
Status: DISCONTINUED | OUTPATIENT
Start: 2024-01-31 | End: 2024-01-31 | Stop reason: HOSPADM

## 2024-01-31 RX ORDER — MEPERIDINE HYDROCHLORIDE 25 MG/ML
12.5 INJECTION INTRAMUSCULAR; INTRAVENOUS; SUBCUTANEOUS AS NEEDED
Status: DISCONTINUED | OUTPATIENT
Start: 2024-01-31 | End: 2024-01-31

## 2024-01-31 RX ORDER — HYDROCODONE BITARTRATE AND ACETAMINOPHEN 5; 325 MG/1; MG/1
1 TABLET ORAL EVERY 6 HOURS PRN
Qty: 15 TABLET | Refills: 0 | Status: SHIPPED | OUTPATIENT
Start: 2024-01-31 | End: 2024-01-31

## 2024-01-31 RX ADMIN — MIDAZOLAM HYDROCHLORIDE 2 MG: 1 INJECTION INTRAMUSCULAR; INTRAVENOUS at 19:00:00

## 2024-01-31 RX ADMIN — SODIUM CHLORIDE, SODIUM LACTATE, POTASSIUM CHLORIDE, CALCIUM CHLORIDE: 600; 310; 30; 20 INJECTION, SOLUTION INTRAVENOUS at 18:55:00

## 2024-01-31 RX ADMIN — NEOSTIGMINE METHYLSULFATE 5 MG: 1 INJECTION, SOLUTION INTRAVENOUS at 19:44:00

## 2024-01-31 RX ADMIN — METOCLOPRAMIDE HYDROCHLORIDE 10 MG: 5 INJECTION INTRAMUSCULAR; INTRAVENOUS at 19:25:00

## 2024-01-31 RX ADMIN — GLYCOPYRROLATE 0.8 MG: 0.2 INJECTION, SOLUTION INTRAMUSCULAR; INTRAVENOUS at 19:44:00

## 2024-01-31 RX ADMIN — GLYCOPYRROLATE 0.2 MG: 0.2 INJECTION, SOLUTION INTRAMUSCULAR; INTRAVENOUS at 19:25:00

## 2024-01-31 RX ADMIN — ONDANSETRON 4 MG: 2 INJECTION INTRAMUSCULAR; INTRAVENOUS at 19:25:00

## 2024-01-31 RX ADMIN — CEFAZOLIN SODIUM IN 0.9 % NACL 3 G: 3 G/100 ML INTRAVENOUS SOLUTION, PIGGYBACK (ML) INTRAVENOUS at 19:19:00

## 2024-01-31 RX ADMIN — DEXAMETHASONE SODIUM PHOSPHATE 4 MG: 4 MG/ML VIAL (ML) INJECTION at 19:25:00

## 2024-01-31 NOTE — DISCHARGE INSTRUCTIONS
Home Care Instructions  [facility]  Dr. You    MEDICATIONS  For post-operative pain control the medications are usually Norco or Tylenol #3.  These are narcotics and are best taken by starting with one tablet and repeating every four to six hours as needed.  If the patient does not feel they need the narcotics they shouldn’t take them.  If the pain is severe the patient may take up to two pills every four hours.  If a minor supplement is necessary in addition to the narcotics do not overlap with Tylenol (any product with acetaminophen) as both Norco and Tylenol #3 contain Tylenol.  Please supplement with ibuprofen. Please ask your doctor before resuming blood thinners such as aspirin, Plavix or Coumadin.  All other home medications may be resumed as scheduled.  It is advisable on the day of surgery to take two tablespoons of Milk of Magnesia twice on this day only.  Repeat only if passing hard stool or if there is no bowel movement within 36 hours of surgery.    DIET  The patient must resume a high fiber diet immediately.  This is not a good time to get constipated.  If the patient were to pass a hard stool the operative site could be damaged.  See my high fiber diet instruction sheet.  Also, be sure to take Metamucil, Fibercon, Citrucel, Konsyl, Benefiber or another bulk laxative using the higher recommended daily dose.  These should be no alcohol consumption in the immediate recovery time period or within six hours of taking narcotics.    WOUND CARE  Any dressings should be removed the day after surgery.  This includes the gauze, tape, and band-aids if they are present.  The patient may shower the day after surgery.  All top gauze type dressings and packing should be removed prior to showering.  The wounds can be washed with soap and water and should be thoroughly cleaned in this manner at least once a day.  No hair dye or chemicals of any kind should get in the wounds.    The wound should be packed  three times a day.  To do this, moisten gauze with saline.  Squeeze the gauze until all of the drops are out.  Open the gauze and tuck it into the wound as deep as it will go.  Fill in the rest of the wound with moistened gauze, up to the level of the skin.  Cover the wound with dry gauze and as little tape as necessary to hold the gauze in place.    ACTIVITY  Every day the patient should be up, showered and dressed.  Each day the patient should be up and around the house.  The patient should not lie in bed and should not stay in pajamas.  We count on the patient being up, coughing, walking and deep breathing to avoid pneumonia and blood clots in the legs.  Once a day the patient should get out of the house and go shopping, go to the mall, the [employer] store, the [employer] or a restaurant.  The patient may ride in a car but should not drive the car for at least 48 hours.  Patients should be off narcotics for at least 8 hours prior to being the .  The average time off work is three to five days, but up to 10 days is sometimes necessary for jobs that include significant physical exertion.  Patients may go up and down stairs and lift up to ten pounds but no bending, pushing or pulling.  Nothing called work or exercise until the follow up visit.  Patients should seek further activity limits at the time of their appointment.    APPOINTMENT  Please call our office for an appointment within three weeks after surgery or as directed by Dr. You. The reason to wait three weeks is because the area is checked by digital rectal exam.  It is at the three week angel that most patients can withstand the post-operative examination.  Any fever greater than 100.5, chills, nausea, vomiting, or severe diarrhea please call our office at [phone]. If the patient is unable to urinate and has a full and painful bladder call us immediately.  For life threatening emergencies call [phone].  For non-emergent care please call our office  after 8:30 a.m. Monday through Friday.  The number listed above is our office number; our phone automatically switches to and answering service if we are not there.  Please do not use the emergency room for non-urgent care.    SPECIAL NOTE  During surgery a long acting local anesthesia is placed to provide postoperative relief.  It wears off within 12 hours of surgery.  Patients can expect increasing pain at this point after surgery.  Please take one or two pain pills as the pain is starting.  Thank you for entrusting us with your care.  [facility]--General Surgery        You have been given a prescription for Norco 5/325  Next dose due:  as needed  Take this medication as directed  This medication contains Tylenol (acetaminophen)  Do not take additional Tylenol while taking Norco    Norco is a Narcotic and can be constipating or upset your stomach  Don't take Norco on an empty stomach  Drink plenty of water  Alcoholic beverages should be avoided while taking narcotics

## 2024-01-31 NOTE — H&P
New Patient Visit Note       Active Problems      1. Pilonidal cyst        Chief Complaint   No chief complaint on file.      Allergies  Lita is allergic to penicillins.    Past Medical / Surgical / Social / Family History    The past medical and past surgical history have been reviewed by me today.    Past Medical History:   Diagnosis Date    Anemia     BRCA gene mutation negative in female 12/2023    Negative 48 gene hereditary cancer panel, x1 VUS, report scanned into media tab    Essential hypertension     High blood pressure     Pleurisy 12/29/2022    Sleep apnea     used CPAP before losing weight, no longer needs it    Visual impairment     glasses     Past Surgical History:   Procedure Laterality Date    OTHER SURGICAL HISTORY      cervical polyp at the age of 29s    OTHER SURGICAL HISTORY      fibroids    WISDOM TEETH REMOVED         The family history and social history have been reviewed by me today.    Social History     Tobacco Use    Smoking status: Some Days     Types: Cigarettes    Smokeless tobacco: Never    Tobacco comments:     2 CIGS A DAY   Substance Use Topics    Alcohol use: Yes     Comment: 1-2 drinks per month        Current Outpatient Medications:     HYDROcodone-acetaminophen 5-325 MG Oral Tab, Take 1 tablet by mouth every 6 (six) hours as needed., Disp: 15 tablet, Rfl: 0      Review of Systems  The Review of Systems has been reviewed by me during today.  Review of Systems   Constitutional:  Negative for diaphoresis, fever and unexpected weight change.   HENT:  Negative for congestion, nosebleeds, sore throat and trouble swallowing.    Eyes:  Negative for pain, discharge, redness and visual disturbance.   Respiratory:  Negative for cough, shortness of breath and wheezing.    Cardiovascular:  Negative for chest pain and palpitations.   Gastrointestinal:  Negative for abdominal distention, abdominal pain, blood in stool, constipation, diarrhea, nausea and vomiting.   Genitourinary:   Negative for difficulty urinating, dysuria and frequency.   Musculoskeletal:  Negative for joint swelling and neck pain.   Skin:  Positive for wound. Negative for color change and rash.   Neurological:  Negative for dizziness, syncope and light-headedness.   Hematological:  Negative for adenopathy. Does not bruise/bleed easily.   Psychiatric/Behavioral:  Negative for confusion, hallucinations and sleep disturbance.        Physical Findings   /79 (BP Location: Right arm)   Pulse 63   Temp 98.1 °F (36.7 °C) (Temporal)   Resp 16   Ht 67\"   Wt 288 lb 12.8 oz (131 kg)   LMP 12/06/2023 (Exact Date)   SpO2 99%   BMI 45.23 kg/m²   Physical Exam  Constitutional:       Appearance: She is well-developed.   HENT:      Head: Normocephalic and atraumatic.   Eyes:      Pupils: Pupils are equal, round, and reactive to light.   Cardiovascular:      Rate and Rhythm: Normal rate and regular rhythm.   Pulmonary:      Effort: Pulmonary effort is normal.      Breath sounds: Normal breath sounds.   Abdominal:      General: Bowel sounds are normal. There is no distension.      Palpations: Abdomen is soft.      Tenderness: There is no abdominal tenderness.   Musculoskeletal:         General: No deformity. Normal range of motion.   Skin:     General: Skin is warm and dry.      Findings: No rash.             Comments: Protuberant 3 cm mass at the superior aspect of the gluteal fold just to the right of midline.  Small sinus tracts are also noted.  No evidence of drainable abscess.  No significant cellulitis.   Neurological:      Mental Status: She is alert and oriented to person, place, and time.             History of Present Illness   43-year-old female who is here for evaluation chronic pilonidal cyst    The patient reports that the pilonidal cyst was initially diagnosed at the age of 17.  At that time she did undergo incision and drainage.  Second incision and drainage was performed at the age of 19.  Over the past several  years she has had intermittent mild symptoms with occasional drainage from this site.  The symptoms were often self-limiting and resolved spontaneously.  However, over the past year, the patient is now noting increased frequency of drainage from this site.  She reports serous and occasionally bloody drainage from the site occurring every other month.  The area is also becoming increasingly tender.    On physical examination there is a protuberant 3 cm mass at the superior aspect of the gluteal fold just to the right of midline.  Send small sinus tracts are also noted.  There is no drainable abscess or cellulitis at this time.    Treatment options were reviewed in detail with the patient.  At this time she does wish to proceed with pilonidal cystectomy for chronic pilonidal cyst and protuberant mass likely scar tissue.    Patient would like to schedule surgery sometime in January after the holidays    Assessment  1. Pilonidal cyst            Plan     Pilonidal cystectomy-surgery to be scheduled January 31, 2023             The risks, benefits, complications, possible outcomes and alternatives of the procedure were explained to the patient in detail.   Expected postoperative pain, recuperation and postoperative course was also reviewed.  All questions from the patient were answered in detail.  The patient did verbalize understanding and does not have any further questions at this time.  The patient does wish to proceed with the proposed procedure.      Orders Placed This Encounter   Procedures    Basic Metabolic Panel (8)    EKG 12 Lead       Imaging & Referrals   VITAL SIGNS  NURSING COMMUNICATION  POCT PREGNANCY URINE  PLACE PIV  ACTIVITY AS TOLERATED  HEIGHT AND WEIGHT  INITIATE ADULT PREOP PROPHYLACTIC ABX PROTOCOL  PLACE SEQUENTIAL COMPRESSION DEVICE  VERIFY INFORMED CONSENT  NPO  DISCHARGE PATIENT  VITAL SIGNS - NOTIFY PHYSICIAN    Follow Up  No follow-ups on file.    Shawna You MD      Please note that  this report has been produced using speech recognition software and may contain errors related to that system including but not limited to errors in grammar, punctuation and spelling as well as words and phrases that possibly may have been recognized inappropriately.  If there are any questions or concerns please contact the dictating provider for clarification.

## 2024-02-01 RX ORDER — MIDAZOLAM HYDROCHLORIDE 1 MG/ML
INJECTION INTRAMUSCULAR; INTRAVENOUS AS NEEDED
Status: DISCONTINUED | OUTPATIENT
Start: 2024-01-31 | End: 2024-02-01 | Stop reason: SURG

## 2024-02-01 NOTE — ANESTHESIA PROCEDURE NOTES
Airway  Date/Time: 1/31/2024 7:05 PM  Urgency: elective    Airway not difficult    General Information and Staff    Patient location during procedure: OR  Anesthesiologist: Eder Lu MD  Performed: anesthesiologist   Performed by: Eder Lu MD  Authorized by: Eder Lu MD      Indications and Patient Condition  Indications for airway management: anesthesia  Spontaneous Ventilation: absent  Sedation level: deep  Preoxygenated: yes  Patient position: sniffing  Mask difficulty assessment: 2 - vent by mask + OA or adjuvant +/- NMBA    Final Airway Details  Final airway type: endotracheal airway      Successful airway: ETT  Cuffed: yes   Successful intubation technique: Video laryngoscopy  Facilitating devices/methods: intubating stylet  Endotracheal tube insertion site: oral  Blade: GlideScope  Blade size: #4  ETT size (mm): 7.5    Cormack-Lehane Classification: grade IIA - partial view of glottis  Placement verified by: capnometry   Cuff volume (mL): 10  Measured from: lips  ETT to lips (cm): 22  Number of attempts at approach: 2  Ventilation between attempts: none  Number of other approaches attempted: 0    Additional Comments  PreO2.  IV induction as noted.  Fair mask ventilation.  Eyes taped.  DL x1 with MAC 3, grade 2B view, unsuccessful attempt.  ETT immediately removed and continued to mask ventilate. Glidescope #4 used, grade 2A view, successful atraumatic oral intubation ETT 7.5, +ETCO2, +BBS.  Taped and secured with mastisol at 22 cm.  Goggles applied for eye protection.

## 2024-02-01 NOTE — OPERATIVE REPORT
[facility]   Op Note    Lita Hernandes Location: OR   [facility] [id number] MRN [id number]   Admission Date 1/31/2024 Operation Date 1/31/2024   Attending Physician Shawna You MD Operating Physician Shawna You MD       Date of Surgery:      1-    Pre-Operative Diagnosis:     Pilonidal cyst-     Indication for surgery:     pilonidal cyst    Post-Operative Diagnosis: Same as above    Procedure Performed:  Excisional complex pilonidal cystectomy- with layered closure    Surgeon(s) and Role:     * Shawna You MD - Primary    Anesthesia: General    Patient History:       43-year-old female who is here for evaluation chronic pilonidal cyst     The patient reports that the pilonidal cyst was initially diagnosed at the age of 17.  At that time she did undergo incision and drainage.  Second incision and drainage was performed at the age of 19.  Over the past several years she has had intermittent mild symptoms with occasional drainage from this site.  The symptoms were often self-limiting and resolved spontaneously.  However, over the past year, the patient is now noting increased frequency of drainage from this site.  She reports serous and occasionally bloody drainage from the site occurring every other month.  The area is also becoming increasingly tender.     On physical examination there is a protuberant 3 cm mass at the superior aspect of the gluteal fold just to the right of midline.  Send small sinus tracts are also noted.  There is no drainable abscess or cellulitis at this time.     Treatment options were reviewed in detail with the patient.  At this time she does wish to proceed with pilonidal cystectomy for chronic pilonidal cyst and protuberant mass likely scar tissue.     Patient would like to schedule surgery today as discussed           Discussed with Patient :  The potential risks and benefits of surgery as well as the expected recuperation was discussed with the patient/family  in detail.  The potential risks including, bleeding, infection, need to leave the wound open and allow it to heal by secondary intention with daily dressing changes, possible wound dehiscence should would be closed primarily, possible development of recurrent pilonidal cyst or sinus despite surgery was reviewed in detail with the patient/family.  They do not have any questions at this time and wish to proceed with surgery today.  The patient was marked in the pre-operative holding area and the marking was confirmed by the patient.      Operative Summary:    The patient was taken to the operating room.  After the induction of general anesthesia, antibiotics were given.    The patient was placed in the prone position with the appropriate axillary and pelvic padding.   The hips flexed in the jackknife position. The perineum was painted with Betadine paint. The prep extends up into the gluteal cleft and to the lower back.  Sterile drapes were placed with wide exposure of the perineum, the gluteal cleft, and the lower back.  The pilonidal cyst and all sinus tracts were probed and the area of disease was defined.  A wide excision was performed back to normal healthy tissue. This was performed with scalpel dissection and with electrocautery down to the healthy subcutaneous plane.  Once clean, healthy, tissue was identified,  hemostasis was achieved with electrocautery.  The specimen was sent to pathology for pathologic diagnosis.  The wound margins were infiltrated with 0.5% Marcaine with epinephrine.    The wound was closed in a layed fashion using Vicryl and Nylon suture.  Sterile dressing was placed  All sponge instrument and needle counts were correct at the conclusion of the procedure  The patient was returned to a supine position and extubated in the operating room    The patient was taken to recovery room in stable condition.      Pathologic Specimen: Pilonidal cyst and sinus tracts    EBL: 5 cc    Shawna You  MD, FACS      Please note that this report has been produced using speech recognition software and may contain errors related to that system including but not limited to errors in grammar, punctuation and spelling as well as words and phrases that possibly may have been recognized inappropriately.  If there are any questions or concerns please contact the dictating provider for clarification.

## 2024-02-01 NOTE — ANESTHESIA PREPROCEDURE EVALUATION
PRE-OP EVALUATION    Patient Name: Lita Hernandes    Admit Diagnosis: Pilonidal cyst [L05.91]    Pre-op Diagnosis: Pilonidal cyst [L05.91]    PILONIDAL CYSTECTOMY    Anesthesia Procedure: PILONIDAL CYSTECTOMY    Surgeon(s) and Role:     * Shawna You MD - Primary    Pre-op vitals reviewed.  Temp: 98.1 °F (36.7 °C)  Pulse: 63  Resp: 16  BP: 143/79  SpO2: 99 %  Body mass index is 45.23 kg/m².    Current medications reviewed.  Hospital Medications:   [MAR Hold] acetaminophen (Tylenol Extra Strength) tab 1,000 mg  1,000 mg Oral Once    [MAR Hold] scopolamine (Transderm-Scop) 1 MG/3DAYS patch 1 patch  1 patch Transdermal Once    lactated ringers infusion   Intravenous Continuous    [COMPLETED] heparin (Porcine) 5000 UNIT/ML injection 5,000 Units  5,000 Units Subcutaneous Once    ceFAZolin (Ancef) 3 g in sodium chloride 0.9% 100mL IVPB premix  3 g Intravenous Once       Outpatient Medications:     Medications Prior to Admission   Medication Sig Dispense Refill Last Dose    NON FORMULARY Pumpkin seed oil 2 capsule po daily  Saw palmetto 1 tablet po daily  Spearmint leaf 1 tablet po daily  Black pepper /turmeric 1 tablet po daily   Past Month    magnesium 250 MG Oral Tab Take 1 tablet (250 mg total) by mouth daily.   Past Month    cholecalciferol 50 MCG (2000 UT) Oral Cap Take 1 capsule (2,000 Units total) by mouth daily.   Past Month    NIFEdipine ER 90 MG Oral Tablet 24 Hr Take 1 tablet (90 mg total) by mouth daily. 90 tablet 1 2024    Fluocinolone Acetonide Scalp 0.01 % External Oil APPLY TO SCALP AND LEAVE ON OVERNIGHT OR FOR MORE THAN 4 HOURS BEFORE WASHING       ketoconazole 2 % External Cream APPLY TOPICALLY TO THE AFFECTED AREA EVERY DAY BEFORE NOON       ketoconazole 2 % External Shampoo        [] azithromycin 250 MG Oral Tab Take 2 tablets (500 mg total) by mouth daily for 1 day, THEN 1 tablet (250 mg total) daily for 4 days. 6 tablet 0     [] predniSONE 20 MG Oral Tab Take 3  tablets (60 mg total) by mouth daily for 5 days. 15 tablet 0     [] sulfamethoxazole-trimethoprim -160 MG Oral Tab per tablet Take 1 tablet by mouth 2 (two) times daily for 10 days. 20 tablet 0     ibuprofen 600 MG Oral Tab Take 1 tablet (600 mg total) by mouth every 6 (six) hours as needed for Pain. For first 5 days of menstrual cycle   More than a month       Allergies: Penicillins      Anesthesia Evaluation        Anesthetic Complications           GI/Hepatic/Renal    Negative GI/hepatic/renal ROS.                             Cardiovascular      ECG reviewed.  Exercise tolerance: good     MET: >4    (+) obesity  (+) hypertension                                     Endo/Other    Negative endo/other ROS.                              Pulmonary                    (+) sleep apnea       Neuro/Psych                                      Past Surgical History:   Procedure Laterality Date    OTHER SURGICAL HISTORY      cervical polyp at the age of 29s    OTHER SURGICAL HISTORY      fibroids    WISDOM TEETH REMOVED       Social History     Socioeconomic History    Marital status:    Tobacco Use    Smoking status: Some Days     Types: Cigarettes    Smokeless tobacco: Never    Tobacco comments:     2 CIGS A DAY   Vaping Use    Vaping Use: Never used   Substance and Sexual Activity    Alcohol use: Yes     Comment: 1-2 drinks per month    Drug use: Never    Sexual activity: Yes     Partners: Male     History   Drug Use Unknown     Available pre-op labs reviewed.     Lab Results   Component Value Date     2024    K 3.7 2024     2024    CO2 25.0 2024    BUN 15 2024    CREATSERUM 0.89 2024     (H) 2024    CA 8.9 2024             ASA: 3   Plan: general  NPO status verified and patient meets guidelines.          Plan/risks discussed with: patient                Present on Admission:  **None**

## 2024-02-01 NOTE — ANESTHESIA POSTPROCEDURE EVALUATION
[facility]    Lita Hernandes Patient Status:  Hospital Outpatient Surgery   Age/Gender 43 year old female MRN [id number]   Location [facility] SURGERY Attending Shawna You MD   Hosp Day # 0 PCP ROWDY CHUNG DO       Anesthesia Post-op Note    PILONIDAL CYSTECTOMY    Procedure Summary       Date: 01/31/24 Room / Location:  MAIN OR 12 /  MAIN OR    Anesthesia Start: 1855 Anesthesia Stop: 2000    Procedure: PILONIDAL CYSTECTOMY (Back) Diagnosis:       Pilonidal cyst      (Pilonidal cyst [L05.91])    Surgeons: Shawna You MD Anesthesiologist: Eder Lu MD    Anesthesia Type: general ASA Status: 3            Anesthesia Type: general    Vitals Value Taken Time   /77 01/31/24 2001   Temp 97.1 01/31/24 2001   Pulse 93 01/31/24 2001   Resp 18 01/31/24 2001   SpO2 100 01/31/24 2001       Patient Location: PACU    Anesthesia Type: general    Airway Patency: patent    Postop Pain Control: adequate    Mental Status: mildly sedated but able to meaningfully participate in the post-anesthesia evaluation    Nausea/Vomiting: none    Cardiopulmonary/Hydration status: stable euvolemic    Complications: no apparent anesthesia related complications    Postop vital signs: stable    Dental Exam: Unchanged from Preop    Patient to be discharged from PACU when criteria met.

## 2024-02-08 ENCOUNTER — OFFICE VISIT (OUTPATIENT)
Dept: FAMILY MEDICINE CLINIC | Facility: CLINIC | Age: 44
End: 2024-02-08
Payer: COMMERCIAL

## 2024-02-08 VITALS
WEIGHT: 293 LBS | TEMPERATURE: 98 F | HEART RATE: 84 BPM | RESPIRATION RATE: 16 BRPM | BODY MASS INDEX: 45.99 KG/M2 | SYSTOLIC BLOOD PRESSURE: 136 MMHG | HEIGHT: 67 IN | DIASTOLIC BLOOD PRESSURE: 82 MMHG

## 2024-02-08 DIAGNOSIS — H60.311 ACUTE DIFFUSE OTITIS EXTERNA OF RIGHT EAR: Primary | ICD-10-CM

## 2024-02-08 PROCEDURE — 3008F BODY MASS INDEX DOCD: CPT | Performed by: FAMILY MEDICINE

## 2024-02-08 PROCEDURE — 99214 OFFICE O/P EST MOD 30 MIN: CPT | Performed by: FAMILY MEDICINE

## 2024-02-08 PROCEDURE — 3079F DIAST BP 80-89 MM HG: CPT | Performed by: FAMILY MEDICINE

## 2024-02-08 PROCEDURE — 3075F SYST BP GE 130 - 139MM HG: CPT | Performed by: FAMILY MEDICINE

## 2024-02-08 NOTE — PROGRESS NOTES
HPI:   Lita Hernandes is a 43 year old female who presents for upper respiratory symptoms for  3  days. Patient reports congestion, right ear pain, denies fever, denies cough, denies sinus pain.    Current Outpatient Medications   Medication Sig Dispense Refill    neomycin-polymyxin-hydrocortisone 3.5-44295-2 Otic Solution Place 4 drops into the right ear 3 (three) times daily for 10 days. 10 mL 0    HYDROcodone-acetaminophen 5-325 MG Oral Tab Take 1 tablet by mouth every 6 (six) hours as needed. 15 tablet 0    NON FORMULARY Pumpkin seed oil 2 capsule po daily  Saw palmetto 1 tablet po daily  Spearmint leaf 1 tablet po daily  Black pepper /turmeric 1 tablet po daily      magnesium 250 MG Oral Tab Take 1 tablet (250 mg total) by mouth daily.      cholecalciferol 50 MCG (2000 UT) Oral Cap Take 1 capsule (2,000 Units total) by mouth daily.      NIFEdipine ER 90 MG Oral Tablet 24 Hr Take 1 tablet (90 mg total) by mouth daily. 90 tablet 1    ibuprofen 600 MG Oral Tab Take 1 tablet (600 mg total) by mouth every 6 (six) hours as needed for Pain. For first 5 days of menstrual cycle      Fluocinolone Acetonide Scalp 0.01 % External Oil APPLY TO SCALP AND LEAVE ON OVERNIGHT OR FOR MORE THAN 4 HOURS BEFORE WASHING      ketoconazole 2 % External Cream APPLY TOPICALLY TO THE AFFECTED AREA EVERY DAY BEFORE NOON      ketoconazole 2 % External Shampoo         Past Medical History:   Diagnosis Date    Anemia     BRCA gene mutation negative in female 12/2023    Negative 48 gene hereditary cancer panel, x1 VUS, report scanned into media tab    Essential hypertension     High blood pressure     Pleurisy 12/29/2022    Sleep apnea     used CPAP before losing weight, no longer needs it    Visual impairment     glasses      Past Surgical History:   Procedure Laterality Date    OTHER SURGICAL HISTORY      cervical polyp at the age of 30s    OTHER SURGICAL HISTORY      fibroids    WISDOM TEETH REMOVED        Family History   Problem  Relation Age of Onset    Diabetes Father     Hypertension Father     Cancer Mother         liver cancer dx age 62    Diabetes Mother     Other (TVH) Mother 35    Other (Hep C pos.) Mother     Skin cancer Paternal Grandmother         dx age 60s    Breast Cancer Maternal Aunt 60    Ovarian Cancer Neg     Uterine Cancer Neg     Pancreatic Cancer Neg     Colon Cancer Neg     Prostate Cancer Neg     Endometriosis Neg     Infertility Neg       Social History     Socioeconomic History    Marital status:    Tobacco Use    Smoking status: Some Days     Types: Cigarettes    Smokeless tobacco: Never    Tobacco comments:     2 CIGS A DAY   Vaping Use    Vaping Use: Never used   Substance and Sexual Activity    Alcohol use: Yes     Comment: 1-2 drinks per month    Drug use: Never    Sexual activity: Yes     Partners: Male         REVIEW OF SYSTEMS:   GENERAL: feels well otherwise  SKIN: no rashes  EYES:denies blurred vision or double vision  HEENT: congested; denies sore throat, facial pain; + right ear pain  LUNGS: denies shortness of breath with exertion; denies cough  CARDIOVASCULAR: denies chest pain on exertion  GI: no nausea or abdominal pain  NEURO: denies headaches    EXAM:   /82   Pulse 84   Temp 97.5 °F (36.4 °C) (Temporal)   Resp 16   Ht 5' 7\" (1.702 m)   Wt 295 lb (133.8 kg)   LMP 12/06/2023 (Exact Date)   BMI 46.20 kg/m²   GENERAL: well developed, well nourished,in no apparent distress  SKIN: no rashes,no suspicious lesions  EYES:PERRL, EOMI,conjunctiva are clear  HEENT: atraumatic, normocephalic,+ right EAC erythematous and edematous, + left TM clear and visible; no maxillary and frontal sinus tenderness  NECK: supple,no adenopathy  LUNGS: clear to auscultation, no r/r/w  CARDIO: RRR without murmur  GI: good BS's,no masses, HSM or tenderness    ASSESSMENT AND PLAN:   Lita Hernandes is a 43 year old female who presents with  right otitis externa . PLAN:  cortisporin ear drops x 7-10 days  .  Saline Rinse.  Tylenol or motrin as needed.  Antihistamine prn.  Fluids.  The patient indicates understanding of these issues and agrees to the plan.  The patient is asked to return if sx's persist or worsen.

## 2024-02-13 ENCOUNTER — OFFICE VISIT (OUTPATIENT)
Facility: LOCATION | Age: 44
End: 2024-02-13
Payer: COMMERCIAL

## 2024-02-13 VITALS — TEMPERATURE: 98 F | HEART RATE: 83 BPM

## 2024-02-13 DIAGNOSIS — Z98.890 POST-OPERATIVE STATE: Primary | ICD-10-CM

## 2024-02-13 PROCEDURE — 99024 POSTOP FOLLOW-UP VISIT: CPT

## 2024-02-13 NOTE — PROGRESS NOTES
Post Operative Visit Note       Active Problems  No diagnosis found.     Chief Complaint   Chief Complaint   Patient presents with    Post-Op     PO - PILONIDAL CYSTECTOMY w/  1/31, soreness, tender to touch, painful to sit down, drainage, no other symptoms.          History of Present Illness   43 year old female POD# 13 from pilonidal cystectomy presents for postop follow up.    The patient states she had been doing well post operatively.  She reports increase in abdominal pain over the last several days with flexion and direct pressure.  The patient reports associated drainage.  She states she is concerned for possible infection.  She denies fevers or chills.  Otherwise the patient states she has been healing appropriately.    She is tolerating oral intake and having bowel function.  She reports constipation over the last several days.  She denies nausea or emesis.        Allergies  [place] is allergic to penicillins.    Past Medical / Surgical / Social / Family History    The past medical and past surgical history have been reviewed by me today.     Past Medical History:   Diagnosis Date    Anemia     BRCA gene mutation negative in female 12/2023    Negative 48 gene hereditary cancer panel, x1 VUS, report scanned into media tab    Essential hypertension     High blood pressure     Pleurisy 12/29/2022    Sleep apnea     used CPAP before losing weight, no longer needs it    Visual impairment     glasses     Past Surgical History:   Procedure Laterality Date    OTHER SURGICAL HISTORY      cervical polyp at the age of 30s    OTHER SURGICAL HISTORY      fibroids    WISDOM TEETH REMOVED         The family history and social history have been reviewed by me today.    Family History   Problem Relation Age of Onset    Diabetes Father     Hypertension Father     Cancer Mother         liver cancer dx age 62    Diabetes Mother     Other (TVH) Mother 35    Other (Hep C pos.) Mother     Skin cancer Paternal Grandmother          dx age 60s    Breast Cancer Maternal Aunt 60    Ovarian Cancer Neg     Uterine Cancer Neg     Pancreatic Cancer Neg     Colon Cancer Neg     Prostate Cancer Neg     Endometriosis Neg     Infertility Neg      Social History     Socioeconomic History    Marital status:    Tobacco Use    Smoking status: Some Days     Types: Cigarettes    Smokeless tobacco: Never    Tobacco comments:     2 CIGS A DAY   Vaping Use    Vaping Use: Never used   Substance and Sexual Activity    Alcohol use: Yes     Comment: 1-2 drinks per month    Drug use: Never    Sexual activity: Yes     Partners: Male        Current Outpatient Medications:     neomycin-polymyxin-hydrocortisone 3.5-44039-4 Otic Solution, Place 4 drops into the right ear 3 (three) times daily for 10 days., Disp: 10 mL, Rfl: 0    HYDROcodone-acetaminophen 5-325 MG Oral Tab, Take 1 tablet by mouth every 6 (six) hours as needed., Disp: 15 tablet, Rfl: 0    NON FORMULARY, Pumpkin seed oil 2 capsule po daily Saw palmetto 1 tablet po daily Spearmint leaf 1 tablet po daily Black pepper /turmeric 1 tablet po daily, Disp: , Rfl:     magnesium 250 MG Oral Tab, Take 1 tablet (250 mg total) by mouth daily., Disp: , Rfl:     cholecalciferol 50 MCG (2000 UT) Oral Cap, Take 1 capsule (2,000 Units total) by mouth daily., Disp: , Rfl:     NIFEdipine ER 90 MG Oral Tablet 24 Hr, Take 1 tablet (90 mg total) by mouth daily., Disp: 90 tablet, Rfl: 1    ibuprofen 600 MG Oral Tab, Take 1 tablet (600 mg total) by mouth every 6 (six) hours as needed for Pain. For first 5 days of menstrual cycle, Disp: , Rfl:     Fluocinolone Acetonide Scalp 0.01 % External Oil, APPLY TO SCALP AND LEAVE ON OVERNIGHT OR FOR MORE THAN 4 HOURS BEFORE WASHING, Disp: , Rfl:     ketoconazole 2 % External Cream, APPLY TOPICALLY TO THE AFFECTED AREA EVERY DAY BEFORE NOON, Disp: , Rfl:     ketoconazole 2 % External Shampoo, , Disp: , Rfl:       Review of Systems  A 10 point Review of Systems has been completed  by me today and is negative except as above in the HPI.    Physical Findings   Pulse 83   Temp 97.7 °F (36.5 °C) (Temporal)   LMP 12/06/2023 (Exact Date)   Gen/psych: alert and oriented, cooperative, no apparent distress  Cardiovascular: regular rate  Respiratory: respirations unlabored, no wheeze  Abdominal: soft, non-tender, non-distended, no guarding/rebound  Sacral: Incision is clean, dry, and intact with nylon sutures.  Small dehiscence at the inferior aspect of the incision with drainage.  The patient is tender as the wound is pulling on her sutures.  Nylon sutures were removed completely.  The patient tolerated this well.  No induration or erythema.          Assessment/Plan  No diagnosis found.    43 year old female POD# 13 from pilonidal cystectomy    Overall the patient is doing well today. Continue to shower with soap and water daily    Continue p.o. Tylenol or Motrin for pain as needed    The patient may apply soap and water directly to the incisions.     Do not lift anything greater than 20 pounds until 4 weeks after surgery.    Pathology  revealed  pilonidal cyst with acute and chronic inflammation.  Negative for dysplasia or malignancy.     The patient is doing well and does not require further follow up appointments. If there are any questions or concerns, please call the office or make an appointment as needed.    The patient understands and agrees to the current plan. All questions and concerns were addressed during today's encounter.           No orders of the defined types were placed in this encounter.       Imaging & Referrals   None    Follow Up  Return if symptoms worsen or fail to improve.    Rick Marie PA-C  [facility]  2/13/2024  8:31 AM

## 2024-02-27 ENCOUNTER — NURSE ONLY (OUTPATIENT)
Facility: LOCATION | Age: 44
End: 2024-02-27
Payer: COMMERCIAL

## 2024-02-27 VITALS — HEART RATE: 85 BPM | TEMPERATURE: 97 F

## 2024-02-27 DIAGNOSIS — Z51.89 VISIT FOR WOUND CHECK: Primary | ICD-10-CM

## 2024-02-27 PROCEDURE — 99024 POSTOP FOLLOW-UP VISIT: CPT

## 2024-02-27 NOTE — PROGRESS NOTES
Post Operative Visit Note       Active Problems  1. Visit for wound check         Chief Complaint   Chief Complaint   Patient presents with    Post-Op     P/O--PILONIDAL CYSTECTOMY 1/31 BCK, Pt. States slight drainage, Pt. Stated nausea and vomiting once on Saturday. Pt. Denies diarrhea. Pt. Denies chills/fever Pt. Denies pain.           History of Present Illness   43 year old female POD# 27 from pilonidal cystectomy presents for postop follow up.    The patient states she is doing well since her last visit.  She reports significant improvement in her pain.  She reports a minimal amount of drainage.  She denies bleeding or increased tenderness.  She denies fevers or chills.  The patient is tolerating oral intake.  She states she has not been having regular bowel movements.  The patient does report feeling nauseous and having an episode of emesis over the weekend.  She states she is not tolerating oral intake but feels her stomach is still \"fluttery \".  She is not on antibiotics or taking narcotics.      Allergies  Lita is allergic to penicillins.    Past Medical / Surgical / Social / Family History    The past medical and past surgical history have been reviewed by me today.     Past Medical History:   Diagnosis Date    Anemia     BRCA gene mutation negative in female 12/2023    Negative 48 gene hereditary cancer panel, x1 VUS, report scanned into media tab    Essential hypertension     High blood pressure     Pleurisy 12/29/2022    Sleep apnea     used CPAP before losing weight, no longer needs it    Visual impairment     glasses     Past Surgical History:   Procedure Laterality Date    OTHER SURGICAL HISTORY      cervical polyp at the age of 30s    OTHER SURGICAL HISTORY      fibroids    WISDOM TEETH REMOVED         The family history and social history have been reviewed by me today.    Family History   Problem Relation Age of Onset    Diabetes Father     Hypertension Father     Cancer Mother         liver  cancer dx age 62    Diabetes Mother     Other (TVH) Mother 35    Other (Hep C pos.) Mother     Skin cancer Paternal Grandmother         dx age 60s    Breast Cancer Maternal Aunt 60    Ovarian Cancer Neg     Uterine Cancer Neg     Pancreatic Cancer Neg     Colon Cancer Neg     Prostate Cancer Neg     Endometriosis Neg     Infertility Neg      Social History     Socioeconomic History    Marital status:    Tobacco Use    Smoking status: Some Days     Types: Cigarettes    Smokeless tobacco: Never    Tobacco comments:     2 CIGS A DAY   Vaping Use    Vaping Use: Never used   Substance and Sexual Activity    Alcohol use: Yes     Comment: 1-2 drinks per month    Drug use: Never    Sexual activity: Yes     Partners: Male        Current Outpatient Medications:     HYDROcodone-acetaminophen 5-325 MG Oral Tab, Take 1 tablet by mouth every 6 (six) hours as needed., Disp: 15 tablet, Rfl: 0    NON FORMULARY, Pumpkin seed oil 2 capsule po daily Saw palmetto 1 tablet po daily Spearmint leaf 1 tablet po daily Black pepper /turmeric 1 tablet po daily, Disp: , Rfl:     magnesium 250 MG Oral Tab, Take 1 tablet (250 mg total) by mouth daily., Disp: , Rfl:     cholecalciferol 50 MCG (2000 UT) Oral Cap, Take 1 capsule (2,000 Units total) by mouth daily., Disp: , Rfl:     NIFEdipine ER 90 MG Oral Tablet 24 Hr, Take 1 tablet (90 mg total) by mouth daily., Disp: 90 tablet, Rfl: 1    ibuprofen 600 MG Oral Tab, Take 1 tablet (600 mg total) by mouth every 6 (six) hours as needed for Pain. For first 5 days of menstrual cycle, Disp: , Rfl:     Fluocinolone Acetonide Scalp 0.01 % External Oil, APPLY TO SCALP AND LEAVE ON OVERNIGHT OR FOR MORE THAN 4 HOURS BEFORE WASHING, Disp: , Rfl:     ketoconazole 2 % External Cream, APPLY TOPICALLY TO THE AFFECTED AREA EVERY DAY BEFORE NOON, Disp: , Rfl:     ketoconazole 2 % External Shampoo, , Disp: , Rfl:       Review of Systems  A 10 point Review of Systems has been completed by me today and is  negative except as above in the HPI.    Physical Findings   Pulse 85   Temp 97.2 °F (36.2 °C) (Temporal)   LMP 12/06/2023 (Exact Date)   Gen/psych: alert and oriented, cooperative, no apparent distress  Cardiovascular: regular rate  Respiratory: respirations unlabored, no wheeze  Abdominal: soft, non-tender, non-distended, no guarding/rebound  Incisions: Sacral incision is healing appropriately.  There is a small area of dehiscence at the inferior third aspect of the incision.  It is about 1 inch long and 2 mm deep at the deepest aspect.  No surrounding erythema or evidence of infection.         Assessment/Plan  1. Visit for wound check        43 year old female POD# 27 from pilonidal cystectomy    Overall the patient is doing well today.  Continue to shower with soap and water daily and place gauze as needed for drainage.  Also discussed beginning a bowel regimen.    Continue p.o. Tylenol or Motrin for pain as needed    The patient may apply soap and water directly to the incisions. Refrain from submerging the incisions until two weeks after surgery.    Do not lift anything greater than 20 pounds for 2 more weeks    The patient is doing well and is to follow-up in 2 weeks for a wound check.  I informed the patient that if her symptoms have completely resolved by the time of her follow-up appointment she may cancel.  If there are any questions or concerns, please call the office or make an appointment as needed.    The patient understands and agrees to the current plan. All questions and concerns were addressed during today's encounter.           No orders of the defined types were placed in this encounter.       Imaging & Referrals   None    Follow Up  Return if symptoms worsen or fail to improve.    Rick Marie PA-C  [facility]  2/27/2024  9:07 AM

## 2024-03-12 ENCOUNTER — OFFICE VISIT (OUTPATIENT)
Facility: LOCATION | Age: 44
End: 2024-03-12
Payer: COMMERCIAL

## 2024-03-12 VITALS — TEMPERATURE: 97 F | HEART RATE: 76 BPM

## 2024-03-12 DIAGNOSIS — Z51.89 VISIT FOR WOUND CHECK: Primary | ICD-10-CM

## 2024-03-12 PROCEDURE — 99024 POSTOP FOLLOW-UP VISIT: CPT

## 2024-03-12 NOTE — PROGRESS NOTES
Post Operative Visit Note       Active Problems  1. Visit for wound check         Chief Complaint   Chief Complaint   Patient presents with    Post-Op     P/O--PILONIDAL CYSTECTOMY 1/31 [facility], Pt. States discharge, yellow fluid, very faint, Pt. Stated bleed once since post op. Pt. Denies n/v/d Pt. Denies fever/chills.           History of Present Illness   43 year old female 6 weeks post op from pilonidal cystectomy with wound dehiscence presents for postop follow up.    The patient continues to do well but reports continued drainage. She states she had a small amount of blood one day. The patient denies increased pain. She denies fever or chills. The patient reports occasional constipation.    She states that she wishes her wound has healed. Otherwise the patient is doing well.       Allergies  [place] is allergic to penicillins.    Past Medical / Surgical / Social / Family History    The past medical and past surgical history have been reviewed by me today.     Past Medical History:   Diagnosis Date    Anemia     BRCA gene mutation negative in female 12/2023    Negative 48 gene hereditary cancer panel, x1 VUS, report scanned into media tab    Essential hypertension     High blood pressure     Pleurisy 12/29/2022    Sleep apnea     used CPAP before losing weight, no longer needs it    Visual impairment     glasses     Past Surgical History:   Procedure Laterality Date    OTHER SURGICAL HISTORY      cervical polyp at the age of 30s    OTHER SURGICAL HISTORY      fibroids    WISDOM TEETH REMOVED         The family history and social history have been reviewed by me today.    Family History   Problem Relation Age of Onset    Diabetes Father     Hypertension Father     Cancer Mother         liver cancer dx age 62    Diabetes Mother     Other (TVH) Mother 35    Other (Hep C pos.) Mother     Skin cancer Paternal Grandmother         dx age 60s    Breast Cancer Maternal Aunt 60    Ovarian Cancer Neg     Uterine Cancer Neg      Pancreatic Cancer Neg     Colon Cancer Neg     Prostate Cancer Neg     Endometriosis Neg     Infertility Neg      Social History     Socioeconomic History    Marital status:    Tobacco Use    Smoking status: Some Days     Types: Cigarettes    Smokeless tobacco: Never    Tobacco comments:     2 CIGS A DAY   Vaping Use    Vaping Use: Never used   Substance and Sexual Activity    Alcohol use: Yes     Comment: 1-2 drinks per month    Drug use: Never    Sexual activity: Yes     Partners: Male        Current Outpatient Medications:     HYDROcodone-acetaminophen 5-325 MG Oral Tab, Take 1 tablet by mouth every 6 (six) hours as needed., Disp: 15 tablet, Rfl: 0    NON FORMULARY, Pumpkin seed oil 2 capsule po daily Saw palmetto 1 tablet po daily Spearmint leaf 1 tablet po daily Black pepper /turmeric 1 tablet po daily, Disp: , Rfl:     magnesium 250 MG Oral Tab, Take 1 tablet (250 mg total) by mouth daily., Disp: , Rfl:     cholecalciferol 50 MCG (2000 UT) Oral Cap, Take 1 capsule (2,000 Units total) by mouth daily., Disp: , Rfl:     NIFEdipine ER 90 MG Oral Tablet 24 Hr, Take 1 tablet (90 mg total) by mouth daily., Disp: 90 tablet, Rfl: 1    ibuprofen 600 MG Oral Tab, Take 1 tablet (600 mg total) by mouth every 6 (six) hours as needed for Pain. For first 5 days of menstrual cycle, Disp: , Rfl:     Fluocinolone Acetonide Scalp 0.01 % External Oil, APPLY TO SCALP AND LEAVE ON OVERNIGHT OR FOR MORE THAN 4 HOURS BEFORE WASHING, Disp: , Rfl:     ketoconazole 2 % External Cream, APPLY TOPICALLY TO THE AFFECTED AREA EVERY DAY BEFORE NOON, Disp: , Rfl:     ketoconazole 2 % External Shampoo, , Disp: , Rfl:       Review of Systems  A 10 point Review of Systems has been completed by me today and is negative except as above in the HPI.    Physical Findings   Pulse 76   Temp 97.1 °F (36.2 °C) (Temporal)   LMP 12/06/2023 (Exact Date)   Gen/psych: alert and oriented, cooperative, no apparent distress  Cardiovascular: regular  rate  Respiratory: respirations unlabored, no wheeze  Abdominal: soft, non-tender, non-distended, no guarding/rebound  Incisions: sacral incision is healing appropriately. Small 2 mm wound opening noted. Exudative material cleaned. Healthy granulation tissue. No surrounding erythema or concern for infection.          Assessment/Plan  1. Visit for wound check        43 year old female 6 weeks post op from pilonidal cystectomy    Overall the patient is doing well today.  Continue to shower with soap and water daily and place gauze in wound bed.        Continue p.o. Tylenol or Motrin for pain as needed     The patient is doing well and is to follow-up ifor a wound check.  I informed the patient that if her symptoms have completely resolved by the time of her follow-up appointment she may cancel.  If there are any questions or concerns, please call the office or make an appointment as needed.     The patient understands and agrees to the current plan. All questions and concerns were addressed during today's encounter.        No orders of the defined types were placed in this encounter.       Imaging & Referrals   None    Follow Up  Return if symptoms worsen or fail to improve.    Rick Marie PA-C  [facility]  3/12/2024  8:53 AM

## 2024-05-15 ENCOUNTER — OFFICE VISIT (OUTPATIENT)
Dept: FAMILY MEDICINE CLINIC | Facility: CLINIC | Age: 44
End: 2024-05-15

## 2024-05-15 VITALS
SYSTOLIC BLOOD PRESSURE: 110 MMHG | WEIGHT: 293 LBS | BODY MASS INDEX: 45.99 KG/M2 | OXYGEN SATURATION: 99 % | RESPIRATION RATE: 18 BRPM | TEMPERATURE: 97 F | HEIGHT: 67 IN | HEART RATE: 81 BPM | DIASTOLIC BLOOD PRESSURE: 80 MMHG

## 2024-05-15 DIAGNOSIS — N89.8 VAGINAL IRRITATION: Primary | ICD-10-CM

## 2024-05-15 PROCEDURE — 3074F SYST BP LT 130 MM HG: CPT | Performed by: FAMILY MEDICINE

## 2024-05-15 PROCEDURE — 3079F DIAST BP 80-89 MM HG: CPT | Performed by: FAMILY MEDICINE

## 2024-05-15 PROCEDURE — G2211 COMPLEX E/M VISIT ADD ON: HCPCS | Performed by: FAMILY MEDICINE

## 2024-05-15 PROCEDURE — 99213 OFFICE O/P EST LOW 20 MIN: CPT | Performed by: FAMILY MEDICINE

## 2024-05-15 PROCEDURE — 3008F BODY MASS INDEX DOCD: CPT | Performed by: FAMILY MEDICINE

## 2024-05-15 PROCEDURE — 81514 NFCT DS BV&VAGINITIS DNA ALG: CPT | Performed by: FAMILY MEDICINE

## 2024-05-15 NOTE — PROGRESS NOTES
[facility] Office Note  Chief Complaint:   Chief Complaint   Patient presents with    Vaginal Problem     Vaginal itching started on 5-11-24       HPI:   This is a 43 year old female coming in for vaginal irritation for the last 4 days.  Denies any vaginal discharge.  Denies any dysuria.  Denies any abdominal pain.  Patient has not taking any over-the-counter medications to help reduce symptoms.  She is currently not sexually active with her .      Past Medical History:    Anemia    BRCA gene mutation negative in female    Negative 48 gene hereditary cancer panel, x1 VUS, report scanned into media tab    Essential hypertension    High blood pressure    Pleurisy    Sleep apnea    used CPAP before losing weight, no longer needs it    Visual impairment    glasses     Past Surgical History:   Procedure Laterality Date    Other surgical history      cervical polyp at the age of 29s    Other surgical history      fibroids    [place] teeth removed       Social History:  Social History     Socioeconomic History    Marital status:    Tobacco Use    Smoking status: Some Days     Types: Cigarettes    Smokeless tobacco: Never    Tobacco comments:     2 CIGS A DAY   Vaping Use    Vaping status: Never Used   Substance and Sexual Activity    Alcohol use: Yes     Comment: 1-2 drinks per month    Drug use: Never    Sexual activity: Yes     Partners: Male     Social Determinants of Health     Physical Activity: Inactive (3/22/2021)    Received from [place]    Exercise Vital Sign     Days of Exercise per Week: 0 days     Minutes of Exercise per Session: 0 min     Family History:  Family History   Problem Relation Age of Onset    Diabetes Father     Hypertension Father     Cancer Mother         liver cancer dx age 62    Diabetes Mother     Other (TVH) Mother 35    Other (Hep C pos.) Mother     Skin cancer Paternal Grandmother         dx age 60s    Breast  Cancer Maternal Aunt 60    Ovarian Cancer Neg     Uterine Cancer Neg     Pancreatic Cancer Neg     Colon Cancer Neg     Prostate Cancer Neg     Endometriosis Neg     Infertility Neg      Allergies:  Allergies   Allergen Reactions    Penicillins HIVES     HIVES AS A CHILD     Current Meds:  Current Outpatient Medications   Medication Sig Dispense Refill    NON FORMULARY Pumpkin seed oil 2 capsule po daily  Saw palmetto 1 tablet po daily  Spearmint leaf 1 tablet po daily  Black pepper /turmeric 1 tablet po daily      magnesium 250 MG Oral Tab Take 1 tablet (250 mg total) by mouth daily.      cholecalciferol 50 MCG (2000 UT) Oral Cap Take 1 capsule (2,000 Units total) by mouth daily.      NIFEdipine ER 90 MG Oral Tablet 24 Hr Take 1 tablet (90 mg total) by mouth daily. 90 tablet 1    ibuprofen 600 MG Oral Tab Take 1 tablet (600 mg total) by mouth every 6 (six) hours as needed for Pain. For first 5 days of menstrual cycle      Fluocinolone Acetonide Scalp 0.01 % External Oil APPLY TO SCALP AND LEAVE ON OVERNIGHT OR FOR MORE THAN 4 HOURS BEFORE WASHING      ketoconazole 2 % External Cream APPLY TOPICALLY TO THE AFFECTED AREA EVERY DAY BEFORE NOON      ketoconazole 2 % External Shampoo         Ready to quit: Not Answered  Counseling given: Not Answered  Tobacco comments: 2 CIGS A DAY       REVIEW OF SYSTEMS:   ROS:  CONSTITUTIONAL:  Denies any unusual weight gain/loss, fever, chills, weakness or fatigue.  CARDIOVASCULAR:  Denies chest pain, chest pressure or chest discomfort. No palpitations or edema.  Denies any dyspnea on exertion or at rest  RESPIRATORY:  Denies shortness of breath, cough  GASTROINTESTINAL:  Denies any abdominal pain, nausea, vomiting  NEUROLOGICAL:  Denies headache, dizziness, syncope, numbness or tingling in the extremities.  MUSCULOSKELETAL:  Denies muscle, back pain, joint pain or stiffness.  : + vaginal irritation, denies vaginal discharge, denies dysuria.    EXAM:   /80   Pulse 81    Temp 97 °F (36.1 °C) (Temporal)   Resp 18   Ht 5' 7\" (1.702 m)   Wt 293 lb (132.9 kg)   LMP 05/01/2024 (Exact Date)   SpO2 99%   BMI 45.89 kg/m²  Estimated body mass index is 45.89 kg/m² as calculated from the following:    Height as of this encounter: 5' 7\" (1.702 m).    Weight as of this encounter: 293 lb (132.9 kg).   Vital signs reviewed.Appears stated age, well groomed.  Physical Exam:  GEN:  Patient is alert and oriented x3, no apparent distress  HEAD:  Normocephalic, atraumatic  LUNGS: clear to auscultation bilaterally, no rales/rhonchi/wheezing  HEART:  Regular rate and rhythm, no murmurs, rubs or gallops  ABDOMEN:  Soft, nondistended, nontender, bowel sounds normal in all 4 quadrants, no hepatosplenomegaly  EXTREMITIES:  Strength intact with 5/5 bilaterally upper and lower extremities, no edema noted  NEURO:  CN 2 - 12 grossly intact     ASSESSMENT AND PLAN:   1. Vaginal irritation  -  check vaginitis/vaginosis panel  -  will treat if abnormal  - Vaginitis Vaginosis PCR Panel; Future      Meds & Refills for this Visit:  Requested Prescriptions      No prescriptions requested or ordered in this encounter       Health Maintenance:  Health Maintenance Due   Topic Date Due    DTaP,Tdap,and Td Vaccines (1 - Tdap) Never done    Annual Physical  11/10/2022    COVID-19 Vaccine (4 - 2023-24 season) 09/01/2023    Annual Depression Screening  01/01/2024    Tobacco Cessation Counseling  Never done       Patient/Caregiver Education: Patient/Caregiver Education: There are no barriers to learning. Medical education done.   Outcome: Patient verbalizes understanding. Patient is notified to call with any questions, complications, allergies, or worsening or changing symptoms.  Patient is to call with any side effects or complications from the treatments as a result of today.     Problem List:  Patient Active Problem List   Diagnosis    Body mass index (BMI) 40.0-44.9, adult (HCC)    Essential hypertension with goal  blood pressure less than 130/80    Status post hysteroscopy    Fibroids, submucosal    Menorrhagia with regular cycle    BRCA gene mutation negative in female       ROWDY CHUNG, DO    Please note that portions of this note may have been completed with a voice recognition program. Efforts were made to edit the dictations but occasionally words are mis-transcribed.

## 2024-05-16 LAB
BV BACTERIA DNA VAG QL NAA+PROBE: NEGATIVE
C GLABRATA DNA VAG QL NAA+PROBE: NEGATIVE
C KRUSEI DNA VAG QL NAA+PROBE: NEGATIVE
CANDIDA DNA VAG QL NAA+PROBE: NEGATIVE
T VAGINALIS DNA VAG QL NAA+PROBE: NEGATIVE

## 2024-07-01 ENCOUNTER — OFFICE VISIT (OUTPATIENT)
Facility: CLINIC | Age: 44
End: 2024-07-01
Payer: COMMERCIAL

## 2024-07-01 ENCOUNTER — LAB ENCOUNTER (OUTPATIENT)
Dept: LAB | Age: 44
End: 2024-07-01
Attending: STUDENT IN AN ORGANIZED HEALTH CARE EDUCATION/TRAINING PROGRAM
Payer: COMMERCIAL

## 2024-07-01 VITALS
SYSTOLIC BLOOD PRESSURE: 136 MMHG | HEIGHT: 67 IN | HEART RATE: 74 BPM | WEIGHT: 288.63 LBS | DIASTOLIC BLOOD PRESSURE: 78 MMHG | BODY MASS INDEX: 45.3 KG/M2

## 2024-07-01 DIAGNOSIS — N92.0 MENORRHAGIA WITH REGULAR CYCLE: ICD-10-CM

## 2024-07-01 DIAGNOSIS — Z86.2 HISTORY OF IRON DEFICIENCY ANEMIA: ICD-10-CM

## 2024-07-01 DIAGNOSIS — Z01.419 ENCOUNTER FOR ANNUAL ROUTINE GYNECOLOGICAL EXAMINATION: Primary | ICD-10-CM

## 2024-07-01 DIAGNOSIS — I10 ESSENTIAL HYPERTENSION WITH GOAL BLOOD PRESSURE LESS THAN 130/80: ICD-10-CM

## 2024-07-01 DIAGNOSIS — Z12.31 BREAST CANCER SCREENING BY MAMMOGRAM: ICD-10-CM

## 2024-07-01 LAB
BASOPHILS # BLD AUTO: 0.04 X10(3) UL (ref 0–0.2)
BASOPHILS NFR BLD AUTO: 0.7 %
DEPRECATED HBV CORE AB SER IA-ACNC: 14.8 NG/ML
EOSINOPHIL # BLD AUTO: 0.16 X10(3) UL (ref 0–0.7)
EOSINOPHIL NFR BLD AUTO: 2.9 %
ERYTHROCYTE [DISTWIDTH] IN BLOOD BY AUTOMATED COUNT: 16.8 %
HCT VFR BLD AUTO: 35.8 %
HGB BLD-MCNC: 11.8 G/DL
IMM GRANULOCYTES # BLD AUTO: 0.01 X10(3) UL (ref 0–1)
IMM GRANULOCYTES NFR BLD: 0.2 %
IRON SATN MFR SERPL: 17 %
IRON SERPL-MCNC: 66 UG/DL
LYMPHOCYTES # BLD AUTO: 3.17 X10(3) UL (ref 1–4)
LYMPHOCYTES NFR BLD AUTO: 58 %
MCH RBC QN AUTO: 26.8 PG (ref 26–34)
MCHC RBC AUTO-ENTMCNC: 33 G/DL (ref 31–37)
MCV RBC AUTO: 81.2 FL
MONOCYTES # BLD AUTO: 0.31 X10(3) UL (ref 0.1–1)
MONOCYTES NFR BLD AUTO: 5.7 %
NEUTROPHILS # BLD AUTO: 1.78 X10 (3) UL (ref 1.5–7.7)
NEUTROPHILS # BLD AUTO: 1.78 X10(3) UL (ref 1.5–7.7)
NEUTROPHILS NFR BLD AUTO: 32.5 %
PLATELET # BLD AUTO: 242 10(3)UL (ref 150–450)
PLATELETS.RETICULATED NFR BLD AUTO: 9.3 % (ref 0–7)
RBC # BLD AUTO: 4.41 X10(6)UL
TIBC SERPL-MCNC: 390 UG/DL (ref 240–450)
TRANSFERRIN SERPL-MCNC: 262 MG/DL (ref 200–360)
WBC # BLD AUTO: 5.5 X10(3) UL (ref 4–11)

## 2024-07-01 PROCEDURE — 99459 PELVIC EXAMINATION: CPT | Performed by: STUDENT IN AN ORGANIZED HEALTH CARE EDUCATION/TRAINING PROGRAM

## 2024-07-01 PROCEDURE — 3008F BODY MASS INDEX DOCD: CPT | Performed by: STUDENT IN AN ORGANIZED HEALTH CARE EDUCATION/TRAINING PROGRAM

## 2024-07-01 PROCEDURE — 82728 ASSAY OF FERRITIN: CPT | Performed by: STUDENT IN AN ORGANIZED HEALTH CARE EDUCATION/TRAINING PROGRAM

## 2024-07-01 PROCEDURE — 99396 PREV VISIT EST AGE 40-64: CPT | Performed by: STUDENT IN AN ORGANIZED HEALTH CARE EDUCATION/TRAINING PROGRAM

## 2024-07-01 PROCEDURE — 85025 COMPLETE CBC W/AUTO DIFF WBC: CPT | Performed by: STUDENT IN AN ORGANIZED HEALTH CARE EDUCATION/TRAINING PROGRAM

## 2024-07-01 PROCEDURE — 3075F SYST BP GE 130 - 139MM HG: CPT | Performed by: STUDENT IN AN ORGANIZED HEALTH CARE EDUCATION/TRAINING PROGRAM

## 2024-07-01 PROCEDURE — 3078F DIAST BP <80 MM HG: CPT | Performed by: STUDENT IN AN ORGANIZED HEALTH CARE EDUCATION/TRAINING PROGRAM

## 2024-07-01 PROCEDURE — 83540 ASSAY OF IRON: CPT | Performed by: STUDENT IN AN ORGANIZED HEALTH CARE EDUCATION/TRAINING PROGRAM

## 2024-07-01 PROCEDURE — 83550 IRON BINDING TEST: CPT | Performed by: STUDENT IN AN ORGANIZED HEALTH CARE EDUCATION/TRAINING PROGRAM

## 2024-07-01 NOTE — PROGRESS NOTES
Patient aware of results and Dr. ROSADO recommendations. Understanding verbalized and no further questions at this time.

## 2024-07-01 NOTE — PROGRESS NOTES
[facility]  Obstetrics and Gynecology   History & Physical    Chief complaint:   Chief Complaint   Patient presents with    Wellness Visit     Annual Exam        HPI: Lita Hernandes is a 43 year old  with Patient's last menstrual period was 2024 (exact date).      Here for annual GYN exam, no specific concerns  Had some vaginal irritation a while ago that resolved  We did hysteroscopy, myomectomy 2023 for HMB with submucosal fibroid, pt had hx Fe def anemia requiring IV Fe  Pt also taking NSAIDs with periods  Periods are regular/monthly, still a little heavy, on heaviest day changes pads about 6x. Cramps are better. Menses not as prolonged as previously   has erectile dysfunction, no sexual activity recently but once he improves they did discuss potentially trying for a pregnancy    Hx 1 abnormal pap that was repeated, normal. No biopsies or procedures on cervix   Pap Date: 22  Pap Result Notes: Negative    PMHx/Surghx reviewed, below. Of note: HTN, BMI 45  Famhx: maternal aunt with breast cancer  Pt had hereditary cancer screening due to family hx multiple cancers and was negative    PCP: ROWDY CHUNG,     Review of Systems:  Constitutional:  Denies fatigue, night sweats, hot flashes  Eyes:  denies blurred or double vision  Cardiovascular:  denies chest pain or palpitations  Respiratory:  denies shortness of breath  Gastrointestinal:  denies heartburn, abdominal pain, diarrhea or constipation  Genitourinary:  denies dysuria, incontinence, abnormal vaginal discharge, vaginal itching  Musculoskeletal:  denies back pain.  Skin/Breast:  Denies any breast pain, lumps, or discharge.   Neurological:  denies headaches, extremity weakness or numbness.  Psychiatric: denies depression or anxiety.  Endocrine:   denies excessive thirst or urination.  Heme/Lymph:  +history of anemia    OB History:  OB History    Para Term  AB Living   0 0 0 0 0 0   SAB IAB  Ectopic Multiple Live Births   0 0 0 0 0       Meds:  Current Outpatient Medications on File Prior to Visit   Medication Sig Dispense Refill    NON FORMULARY Pumpkin seed oil 2 capsule po daily  Saw palmetto 1 tablet po daily  Spearmint leaf 1 tablet po daily  Black pepper /turmeric 1 tablet po daily      magnesium 250 MG Oral Tab Take 1 tablet (250 mg total) by mouth daily.      cholecalciferol 50 MCG (2000 UT) Oral Cap Take 1 capsule (2,000 Units total) by mouth daily.      NIFEdipine ER 90 MG Oral Tablet 24 Hr Take 1 tablet (90 mg total) by mouth daily. 90 tablet 1    Fluocinolone Acetonide Scalp 0.01 % External Oil APPLY TO SCALP AND LEAVE ON OVERNIGHT OR FOR MORE THAN 4 HOURS BEFORE WASHING      ketoconazole 2 % External Cream APPLY TOPICALLY TO THE AFFECTED AREA EVERY DAY BEFORE NOON      ketoconazole 2 % External Shampoo       ibuprofen 600 MG Oral Tab Take 1 tablet (600 mg total) by mouth every 6 (six) hours as needed for Pain. For first 5 days of menstrual cycle       No current facility-administered medications on file prior to visit.       All:  Allergies   Allergen Reactions    Penicillins HIVES     HIVES AS A CHILD       PMH:  Past Medical History:    Anemia    BRCA gene mutation negative in female    Negative 48 gene hereditary cancer panel, x1 VUS, report scanned into media tab    Essential hypertension    High blood pressure    Pleurisy    Sleep apnea    used CPAP before losing weight, no longer needs it    Visual impairment    glasses       PSH:  Past Surgical History:   Procedure Laterality Date    Other surgical history      cervical polyp at the age of 30s    Other surgical history      fibroids    [place] teeth removed         Social History:  Social History     Socioeconomic History    Marital status:      Spouse name: Not on file    Number of children: Not on file    Years of education: Not on file    Highest education level: Not on file   Occupational History    Not on file   Tobacco  Use    Smoking status: Some Days     Types: Cigarettes    Smokeless tobacco: Never    Tobacco comments:     2 CIGS A DAY   Vaping Use    Vaping status: Never Used   Substance and Sexual Activity    Alcohol use: Yes     Comment: 1-2 drinks per month    Drug use: Never    Sexual activity: Yes     Partners: Male   Other Topics Concern    Caffeine Concern Not Asked    Exercise Not Asked    Seat Belt Not Asked    Special Diet Not Asked    Stress Concern Not Asked    Weight Concern Not Asked   Social History Narrative    Not on file     Social Determinants of Health     Financial Resource Strain: Not on file   Food Insecurity: Not on file   Transportation Needs: Not on file   Physical Activity: Inactive (3/22/2021)    Received from [email or web address] [email or web address]    Exercise Vital Sign     Days of Exercise per Week: 0 days     Minutes of Exercise per Session: 0 min   Stress: Not on file   Social Connections: Not on file   Housing Stability: Not on file        Family History:  Family History   Problem Relation Age of Onset    Diabetes Father     Hypertension Father     Cancer Mother         liver cancer dx age 62    Diabetes Mother     Other (TVH) Mother 35    Other (Hep C pos.) Mother     Skin cancer Paternal Grandmother         dx age 60s    Breast Cancer Maternal Aunt 60    Ovarian Cancer Neg     Uterine Cancer Neg     Pancreatic Cancer Neg     Colon Cancer Neg     Prostate Cancer Neg     Endometriosis Neg     Infertility Neg        PHYSICAL EXAM:     Vitals:    07/01/24 0821   BP: 136/78   Pulse: 74   Weight: 288 lb 9.6 oz (130.9 kg)   Height: 67\"       Body mass index is 45.2 kg/m².      Constitutional: well developed, well nourished  Head/Face: normocephalic  Lymphatic: no abnormal supraclavicular or axillary adenopathy is noted  Breast: normal without palpable masses, tenderness, asymmetry, nipple discharge, nipple retraction or skin changes  Abdomen:  soft, nontender, nondistended, no  masses  Skin/Hair: no unusual rashes or bruises  Extremities: no edema, no cyanosis  Psychiatric:  Oriented to time, place, person and situation. Appropriate mood and affect    Pelvic Exam:  External Genitalia: normal appearance, hair distribution, and no lesions  Urethral Meatus:  normal in size, location, without lesions and prolapse  Bladder:  No fullness, masses or tenderness  Vagina:  Normal appearance without lesions, no abnormal discharge  Cervix:  Normal without tenderness on motion  Uterus: normal in size, contour, position, mobility, without tenderness  Adnexa: normal without masses or tenderness  Perineum: normal  Anus: no hemorrhoids     Assessment & Plan:     Lita Hernandes is a 43 year old      Diagnoses and all orders for this visit:    Encounter for annual routine gynecological examination    Menorrhagia with regular cycle  -     Iron And Tibc; Future  -     Ferritin; Future  -     CBC W Differential W Platelet; Future    History of iron deficiency anemia  -     Iron And Tibc; Future  -     Ferritin; Future  -     CBC W Differential W Platelet; Future       Normal breast and pelvic exam  Will check CBC, Fe studies, have not been done since prior to hysteroscopy  For now pt would like to continue with NSAIDs for HMB and she might actually want to try for a pregnancy, declines Mirena IUD right now vs ablation/hyst    Healthcare maintenance:  Pap: 2022 NILM HPV neg, remote hx abnormal pap without colpo or leep   Contraception: see above  Mammogram: due in Nov, ordered  Colonoscopy, age 44     Magda Hartman MD  [facility] - OBGYN

## 2024-07-02 NOTE — TELEPHONE ENCOUNTER
Due for Physical Exam. Please call and schedule, then route to Dr. Henderson for refill.    Did not pass protocol  Last office visit 7/12/2023  Last refill was: 11/10, 90 tabs x1  Next appointment: none    Please sign pended medication if appropriate

## 2024-07-03 RX ORDER — NIFEDIPINE 90 MG/1
90 TABLET, FILM COATED, EXTENDED RELEASE ORAL DAILY
Qty: 90 TABLET | Refills: 0 | Status: SHIPPED | OUTPATIENT
Start: 2024-07-03

## 2024-07-12 ENCOUNTER — TELEPHONE (OUTPATIENT)
Dept: FAMILY MEDICINE CLINIC | Facility: CLINIC | Age: 44
End: 2024-07-12

## 2024-07-12 ENCOUNTER — APPOINTMENT (OUTPATIENT)
Dept: CT IMAGING | Facility: HOSPITAL | Age: 44
End: 2024-07-12
Attending: EMERGENCY MEDICINE
Payer: COMMERCIAL

## 2024-07-12 ENCOUNTER — HOSPITAL ENCOUNTER (EMERGENCY)
Facility: HOSPITAL | Age: 44
Discharge: HOME | End: 2024-07-13
Attending: EMERGENCY MEDICINE
Payer: COMMERCIAL

## 2024-07-12 DIAGNOSIS — J06.9 UPPER RESPIRATORY TRACT INFECTION, UNSPECIFIED TYPE: ICD-10-CM

## 2024-07-12 DIAGNOSIS — R00.2 PALPITATIONS: Primary | ICD-10-CM

## 2024-07-12 LAB
ALBUMIN SERPL-MCNC: 3.5 G/DL (ref 3.4–5)
ALBUMIN/GLOB SERPL: 0.7 {RATIO} (ref 1–2)
ALP LIVER SERPL-CCNC: 74 U/L
ALT SERPL-CCNC: 25 U/L
ANION GAP SERPL CALC-SCNC: 5 MMOL/L (ref 0–18)
AST SERPL-CCNC: 14 U/L (ref 15–37)
B-HCG SERPL-ACNC: <1 MIU/ML
BASOPHILS # BLD AUTO: 0.09 X10(3) UL (ref 0–0.2)
BASOPHILS NFR BLD AUTO: 1.2 %
BILIRUB SERPL-MCNC: 0.3 MG/DL (ref 0.1–2)
BUN BLD-MCNC: 15 MG/DL (ref 9–23)
CALCIUM BLD-MCNC: 9.1 MG/DL (ref 8.5–10.1)
CHLORIDE SERPL-SCNC: 105 MMOL/L (ref 98–112)
CO2 SERPL-SCNC: 28 MMOL/L (ref 21–32)
CREAT BLD-MCNC: 1 MG/DL
D DIMER PPP FEU-MCNC: 0.36 UG/ML FEU (ref ?–0.5)
EGFRCR SERPLBLD CKD-EPI 2021: 71 ML/MIN/1.73M2 (ref 60–?)
EOSINOPHIL # BLD AUTO: 0.22 X10(3) UL (ref 0–0.7)
EOSINOPHIL NFR BLD AUTO: 3 %
ERYTHROCYTE [DISTWIDTH] IN BLOOD BY AUTOMATED COUNT: 16.6 %
FLUAV + FLUBV RNA SPEC NAA+PROBE: NEGATIVE
FLUAV + FLUBV RNA SPEC NAA+PROBE: NEGATIVE
GLOBULIN PLAS-MCNC: 4.9 G/DL (ref 2.8–4.4)
GLUCOSE BLD-MCNC: 123 MG/DL (ref 70–99)
HCT VFR BLD AUTO: 33.9 %
HGB BLD-MCNC: 11.1 G/DL
IMM GRANULOCYTES # BLD AUTO: 0.02 X10(3) UL (ref 0–1)
IMM GRANULOCYTES NFR BLD: 0.3 %
LYMPHOCYTES # BLD AUTO: 3.96 X10(3) UL (ref 1–4)
LYMPHOCYTES NFR BLD AUTO: 53.7 %
MAGNESIUM SERPL-MCNC: 2 MG/DL (ref 1.6–2.6)
MCH RBC QN AUTO: 27.2 PG (ref 26–34)
MCHC RBC AUTO-ENTMCNC: 32.7 G/DL (ref 31–37)
MCV RBC AUTO: 83.1 FL
MONOCYTES # BLD AUTO: 0.49 X10(3) UL (ref 0.1–1)
MONOCYTES NFR BLD AUTO: 6.6 %
NEUTROPHILS # BLD AUTO: 2.6 X10 (3) UL (ref 1.5–7.7)
NEUTROPHILS # BLD AUTO: 2.6 X10(3) UL (ref 1.5–7.7)
NEUTROPHILS NFR BLD AUTO: 35.2 %
NT-PROBNP SERPL-MCNC: 23 PG/ML (ref ?–125)
OSMOLALITY SERPL CALC.SUM OF ELEC: 288 MOSM/KG (ref 275–295)
PLATELET # BLD AUTO: 258 10(3)UL (ref 150–450)
POTASSIUM SERPL-SCNC: 3.7 MMOL/L (ref 3.5–5.1)
PROT SERPL-MCNC: 8.4 G/DL (ref 6.4–8.2)
RBC # BLD AUTO: 4.08 X10(6)UL
RSV RNA SPEC NAA+PROBE: NEGATIVE
SARS-COV-2 RNA RESP QL NAA+PROBE: NOT DETECTED
SODIUM SERPL-SCNC: 138 MMOL/L (ref 136–145)
TROPONIN I SERPL HS-MCNC: 29 NG/L
WBC # BLD AUTO: 7.4 X10(3) UL (ref 4–11)

## 2024-07-12 PROCEDURE — 36415 COLL VENOUS BLD VENIPUNCTURE: CPT

## 2024-07-12 PROCEDURE — 85379 FIBRIN DEGRADATION QUANT: CPT | Performed by: EMERGENCY MEDICINE

## 2024-07-12 PROCEDURE — 71275 CT ANGIOGRAPHY CHEST: CPT | Performed by: EMERGENCY MEDICINE

## 2024-07-12 PROCEDURE — 84702 CHORIONIC GONADOTROPIN TEST: CPT | Performed by: EMERGENCY MEDICINE

## 2024-07-12 PROCEDURE — 93010 ELECTROCARDIOGRAM REPORT: CPT

## 2024-07-12 PROCEDURE — 84484 ASSAY OF TROPONIN QUANT: CPT

## 2024-07-12 PROCEDURE — 80053 COMPREHEN METABOLIC PANEL: CPT | Performed by: EMERGENCY MEDICINE

## 2024-07-12 PROCEDURE — 85025 COMPLETE CBC W/AUTO DIFF WBC: CPT | Performed by: EMERGENCY MEDICINE

## 2024-07-12 PROCEDURE — 99284 EMERGENCY DEPT VISIT MOD MDM: CPT

## 2024-07-12 PROCEDURE — 0241U SARS-COV-2/FLU A AND B/RSV BY PCR (GENEXPERT): CPT | Performed by: EMERGENCY MEDICINE

## 2024-07-12 PROCEDURE — 80053 COMPREHEN METABOLIC PANEL: CPT

## 2024-07-12 PROCEDURE — 93005 ELECTROCARDIOGRAM TRACING: CPT

## 2024-07-12 PROCEDURE — 84484 ASSAY OF TROPONIN QUANT: CPT | Performed by: EMERGENCY MEDICINE

## 2024-07-12 PROCEDURE — 85025 COMPLETE CBC W/AUTO DIFF WBC: CPT

## 2024-07-12 PROCEDURE — 83735 ASSAY OF MAGNESIUM: CPT | Performed by: EMERGENCY MEDICINE

## 2024-07-12 PROCEDURE — 83880 ASSAY OF NATRIURETIC PEPTIDE: CPT | Performed by: EMERGENCY MEDICINE

## 2024-07-12 NOTE — TELEPHONE ENCOUNTER
Appointment For: Lita Hernandes ([id number])   Visit Type: MYCHART EXAM (2964)      7/18/2024    2:15 PM  15 mins.  ROWDY CHUNG        [facility] [address]      Patient Comments:   Coughing up blood and difficulty taking deep breath     Please triage.

## 2024-07-12 NOTE — TELEPHONE ENCOUNTER
Started as a dry cough two days ago. Feels mid chest area, heart is fluttering. When coughing has some blood. Denies any shortness of breath or dizziness. Called Dr. Henderson. Dr. Henderson recommended for patient to go to the ER. Relayed Dr. Henderson's recommendation to the patient. Per patient, she is at work and unable to go now but at some point she will. She also verbalized that she will be changing jobs soon and that her insurance will change. Patient is worried that she will pay a lot if she goes to the ER. Educated patient that her condition needs immediate attention and per Dr. Henderson, she needs to go to the emergency room for it and if she doesn't follow, this will be against medical advice. Patient understood and said she will go to ER.

## 2024-07-13 VITALS
TEMPERATURE: 99 F | WEIGHT: 288 LBS | DIASTOLIC BLOOD PRESSURE: 71 MMHG | RESPIRATION RATE: 16 BRPM | BODY MASS INDEX: 45.2 KG/M2 | OXYGEN SATURATION: 98 % | SYSTOLIC BLOOD PRESSURE: 140 MMHG | HEART RATE: 70 BPM | HEIGHT: 67 IN

## 2024-07-13 LAB
ATRIAL RATE: 95 BPM
P AXIS: 40 DEGREES
P-R INTERVAL: 176 MS
Q-T INTERVAL: 350 MS
QRS DURATION: 96 MS
QTC CALCULATION (BEZET): 439 MS
R AXIS: 70 DEGREES
T AXIS: 18 DEGREES
VENTRICULAR RATE: 95 BPM

## 2024-07-13 NOTE — ED PROVIDER NOTES
Patient Seen in: [facility]      History     Chief Complaint   Patient presents with    Arrythmia/Palpitations     Stated Complaint: heart palpitations, heartburn.    Subjective:   HPI    Patient is a 43-year-old female presenting to the ED with palpitations associate with recent cough and occasional hemoptysis.  She developed a \"cold\" a couple of weeks ago that was mostly nasal congestion.  Those symptoms resolved and she developed a dry cough followed by hemoptysis.  She also experiences some shortness of breath at times describing this more as a sensation that she cannot take a deep breath.  Patient states that she occasionally experiences swelling in her ankles but attributes this to sitting at a counter height stool with her legs hanging down in her kitchen.  She had a recent travel history driving approximately 5 hours to [place] but she did stop in the middle of her trip.  She does smoke cigarettes occasionally.  No family history of DVT.  The patient has had some hemoptysis but she notices this mostly in the morning when she is brushing her teeth  hemoptysis not continuing throughout the day.  No fevers or chills.  Her repeat temperature is 98.6 orally when I assessed her.  She also has a history of heartburn which has responded to over-the-counter omeprazole.  She states that her GERD symptoms are worse when laying flat as well as when she eats tomatoes or tomato sauce, drinks alcohol.  She has a sensation of her food coming back up.  It often wakes her up in the middle the night.  No exertional chest pain.    Objective:   Past Medical History:    Anemia    BRCA gene mutation negative in female    Negative 48 gene hereditary cancer panel, x1 VUS, report scanned into media tab    Essential hypertension    High blood pressure    Pleurisy    Sleep apnea    used CPAP before losing weight, no longer needs it    Visual impairment    glasses              Past Surgical History:    Procedure Laterality Date    Other surgical history      cervical polyp at the age of 29s    Other surgical history      fibroids    [place] teeth removed                  Social History     Socioeconomic History    Marital status:    Tobacco Use    Smoking status: Some Days     Types: Cigarettes    Smokeless tobacco: Never    Tobacco comments:     2 CIGS A DAY   Vaping Use    Vaping status: Never Used   Substance and Sexual Activity    Alcohol use: Yes     Comment: 1-2 drinks per month    Drug use: Never    Sexual activity: Yes     Partners: Male     Social Determinants of Health     Physical Activity: Inactive (3/22/2021)    Received from [facility] [employer]    Exercise Vital Sign     Days of Exercise per Week: 0 days     Minutes of Exercise per Session: 0 min              Review of Systems    Positive for stated Chief Complaint: Arrythmia/Palpitations    Other systems are as noted in HPI.  Constitutional and vital signs reviewed.      All other systems reviewed and negative except as noted above.    Physical Exam     ED Triage Vitals [07/12/24 2054]   /80   Pulse 98   Resp 18   Temp 99.3 °F (37.4 °C)   Temp src Temporal   SpO2 99 %   O2 Device None (Room air)       Current Vitals:   Vital Signs  BP: 148/80  Pulse: 75  Resp: 18  Temp: 99.3 °F (37.4 °C)  Temp src: Temporal  MAP (mmHg): (!) 103    Oxygen Therapy  SpO2: 100 %  O2 Device: None (Room air)            Physical Exam  Vitals and nursing note reviewed.   Constitutional:       General: She is not in acute distress.     Appearance: Normal appearance. She is well-developed. She is obese. She is not ill-appearing or toxic-appearing.   HENT:      Head: Normocephalic and atraumatic.      Right Ear: External ear normal.      Left Ear: External ear normal.      Nose: Nose normal.      Mouth/Throat:      Mouth: Mucous membranes are moist.      Pharynx: Oropharynx is clear.   Eyes:      Conjunctiva/sclera: Conjunctivae normal.    Cardiovascular:      Rate and Rhythm: Normal rate and regular rhythm.      Heart sounds: Normal heart sounds.   Pulmonary:      Effort: Pulmonary effort is normal.      Breath sounds: Normal breath sounds.   Abdominal:      General: Abdomen is flat. Bowel sounds are normal. There is no distension.      Tenderness: There is no abdominal tenderness.   Musculoskeletal:      Right lower leg: No edema.      Left lower leg: No edema.   Skin:     General: Skin is warm.      Capillary Refill: Capillary refill takes less than 2 seconds.      Findings: No rash.   Neurological:      General: No focal deficit present.      Mental Status: She is alert and oriented to person, place, and time.   Psychiatric:         Mood and Affect: Mood normal.         Behavior: Behavior normal.               ED Course     Labs Reviewed   COMP METABOLIC PANEL (14) - Abnormal; Notable for the following components:       Result Value    Glucose 123 (*)     AST 14 (*)     Total Protein 8.4 (*)     Globulin  4.9 (*)     A/G Ratio 0.7 (*)     All other components within normal limits   CBC W/ DIFFERENTIAL - Abnormal; Notable for the following components:    HGB 11.1 (*)     HCT 33.9 (*)     All other components within normal limits   TROPONIN I HIGH SENSITIVITY - Normal   HCG, BETA SUBUNIT (QUANT PREGNANCY TEST) - Normal   MAGNESIUM - Normal   D-DIMER - Normal   PRO BETA NATRIURETIC PEPTIDE - Normal   SARS-COV-2/FLU A AND B/RSV BY PCR (GENEXPERT) - Normal    Narrative:     This test is intended for the qualitative detection and differentiation of SARS-CoV-2, influenza A, influenza B, and respiratory syncytial virus (RSV) viral RNA in nasopharyngeal or nares swabs from individuals suspected of respiratory viral infection consistent with COVID-19 by their healthcare provider. Signs and symptoms of respiratory viral infection due to SARS-CoV-2, influenza, and RSV can be similar.    Test performed using the Xpert Xpress SARS-CoV-2/FLU/RSV (real time  RT-PCR)  assay on the GeneXpert instrument, [employer], [place] [address].   This test is being used under the Food and Drug Administration's Emergency Use Authorization.    The authorized Fact Sheet for Healthcare Providers for this assay is available upon request from the laboratory.   CBC WITH DIFFERENTIAL WITH PLATELET    Narrative:     The following orders were created for panel order CBC With Differential With Platelet.  Procedure                               Abnormality         Status                     ---------                               -----------         ------                     CBC W/ DIFFERENTIAL[[id number]]          Abnormal            Final result                 Please view results for these tests on the individual orders.   RAINBOW DRAW LAVENDER   RAINBOW DRAW LIGHT GREEN   RAINBOW DRAW BLUE     EKG    Rate, intervals and axes as noted on EKG Report.  Rate: 95  Rhythm: Sinus Rhythm  Reading: Normal                          MDM      History obtained from patient.     Differential diagnosis includes pneumonia, PE, DVT, ACS, CHF, viral illness, bleeding from oral nasopharynx, malignancy, GERD, cardiac dysrhythmia, electrolyte disturbance, anxiety.    Previous records reviewed.  Patient had most recent office visit May 2024 for vaginal irritation followed by a GYN visit for annual exam.  This was in July 2024.  Patient also had surgery January 2024 for pilonidal cyst.    Testing considered and ordered includes EKG, CBC, CMP, viral testing, hCG, troponin, D-dimer, magnesium, BNP, CTA    I reviewed all results.  CBC unremarkable, CMP also unremarkable, hCG negative, D-dimer is negative however cannot rule out PE as patient would be high risk given risk factors noted above.  Troponin is negative, magnesium is normal, BNP is normal, viral testing is negative.  Will not pursue bilateral lower extremity ultrasounds given negative D-dimer with bilateral lower extremity edema without unilateral edema but  will proceed with CT of the chest.    I also reviewed the official report which shows   CT ANGIOGRAPHY, CHEST (CPT=71275)    Result Date: 7/12/2024  PROCEDURE:  CT ANGIOGRAPHY, CHEST (CPT=71275)  COMPARISON:  [facility] , CT, CT ANGIOGRAPHY, CHEST (CPT=71275), 12/28/2022, 5:10 PM.  INDICATIONS:  heart palpitations, heartburn.  TECHNIQUE:  IV contrast-enhanced multislice CT angiography is performed through the pulmonary arterial anatomy. 3D volume renderings are generated.  Dose reduction techniques were used. Dose information is transmitted to the ACR (American College of Radiology) NRDR (National Radiology Data Registry) which includes the Dose Index Registry.  PATIENT STATED HISTORY:(As transcribed by Technologist)  Patient has had heart palpatations with coughing up blood.   CONTRAST USED:  100cc of Isovue 370  FINDINGS:  VASCULATURE:  No visible pulmonary arterial thrombus or attenuation.  THORACIC AORTA:  No aneurysm or visible dissection.  LUNGS:  No visible pulmonary disease.  MEDIASTINUM:  No adenopathy or mass.  DANITZA:  Right perihilar lymph node measures 1.7 x 1.2 cm. CARDIAC:  No enlargement, pericardial thickening, or significant coronary artery calcification. PLEURA:  No mass or effusion.  CHEST WALL:  Mildly prominent right axillary lymph node measures 2.2 x 1.0 cm. LIMITED ABDOMEN:  Limited images of the upper abdomen are unremarkable.  BONES:  No bony lesion or fracture.  OTHER:  Negative.             CONCLUSION:  No evidence of pulmonary embolus.  No suspicious nodule, mass or consolidation.    LOCATION:  [facility]   Dictated by (CST): Adam Morales MD on 7/12/2024 at 11:29 PM     Finalized by (CST): Adam Morales MD on 7/12/2024 at 11:31 PM              No interventions required in ED.  Patient only has hemoptysis in the morning while brushing her teeth.  I suspect the source could be from the oropharynx.  She does not have any hemoptysis throughout the day.  Workup has been unremarkable.  I do  suspect she likely has a viral illness or URI causing her symptoms in addition to possible GERD.  If palpitations continue, recommend outpatient Holter or event monitoring.  Patient remained normal sinus rhythm on EKG as well as on cardiac monitor during ED stay.  Follow-up with primary care provider for reevaluation as well as further testing for symptoms.  EKG is normal.      Return to ED if any symptoms worsen, persist, or new symptoms                                         Medical Decision Making      Disposition and Plan     Clinical Impression:  1. Palpitations    2. Upper respiratory tract infection, unspecified type         Disposition:  Discharge  7/12/2024 11:41 pm    Follow-up:  Thang Henderson DO  [address] [place] [id number]  [phone]    Schedule an appointment as soon as possible for a visit in 2 [facility](s)      [facility] Emergency Department  [address] [place] [id number] [phone]  Follow up  IF SYMPTOMS WORSEN, PERSIST, OR NEW SYMPTOMS DEVEL          Medications Prescribed:  Current Discharge Medication List

## 2024-07-13 NOTE — ED INITIAL ASSESSMENT (HPI)
Pt presents with c/o palpitations, unable to take deep breaths.  Denies CP.  Pt states \"she is coughing up blood when she brushing her teeth for the last 3 days.\"  Denies fever.

## 2024-07-17 ENCOUNTER — HOSPITAL ENCOUNTER (OUTPATIENT)
Dept: MAMMOGRAPHY | Age: 44
Discharge: HOME | End: 2024-07-17
Attending: STUDENT IN AN ORGANIZED HEALTH CARE EDUCATION/TRAINING PROGRAM
Payer: COMMERCIAL

## 2024-07-17 DIAGNOSIS — Z12.31 BREAST CANCER SCREENING BY MAMMOGRAM: ICD-10-CM

## 2024-07-17 PROCEDURE — 77067 SCR MAMMO BI INCL CAD: CPT | Performed by: STUDENT IN AN ORGANIZED HEALTH CARE EDUCATION/TRAINING PROGRAM

## 2024-07-17 PROCEDURE — 77063 BREAST TOMOSYNTHESIS BI: CPT | Performed by: STUDENT IN AN ORGANIZED HEALTH CARE EDUCATION/TRAINING PROGRAM

## 2024-10-02 DIAGNOSIS — I10 ESSENTIAL HYPERTENSION WITH GOAL BLOOD PRESSURE LESS THAN 130/80: ICD-10-CM

## 2024-10-03 DIAGNOSIS — I10 ESSENTIAL HYPERTENSION WITH GOAL BLOOD PRESSURE LESS THAN 130/80: ICD-10-CM

## 2024-10-03 RX ORDER — NIFEDIPINE 90 MG/1
90 TABLET, FILM COATED, EXTENDED RELEASE ORAL DAILY
Qty: 30 TABLET | Refills: 0 | OUTPATIENT
Start: 2024-10-03

## 2024-10-03 NOTE — TELEPHONE ENCOUNTER
Last Office Visit: 11/10/2023  Last Refill: 7/3/24  Return to Clinic: 3-6 months   Protocol: failed   NOV: 10/9/24  Requested Prescriptions     Pending Prescriptions Disp Refills    NIFEDIPINE ER 90 MG Oral Tablet 24 Hr [Pharmacy Med Name: NIFEDIPINE 90MG ER (CC) TABLETS] 90 tablet 0     Sig: TAKE 1 TABLET(90 MG) BY MOUTH DAILY         Please approve if appropriate.     Thank you!

## 2024-10-04 RX ORDER — NIFEDIPINE 90 MG/1
90 TABLET, FILM COATED, EXTENDED RELEASE ORAL DAILY
Qty: 30 TABLET | Refills: 0 | Status: SHIPPED | OUTPATIENT
Start: 2024-10-04

## 2024-10-04 NOTE — TELEPHONE ENCOUNTER
Last Office Visit: 11/10/23  Last Refill: 7/3/24  Return to Clinic: 3-6 months   Protocol: failed   NOV: 10/9/24  Requested Prescriptions     Pending Prescriptions Disp Refills    NIFEdipine ER 90 MG Oral Tablet 24 Hr 90 tablet 0     Sig: Take 1 tablet (90 mg total) by mouth daily.         Please approve if appropriate.     Thank you!

## 2024-10-09 ENCOUNTER — OFFICE VISIT (OUTPATIENT)
Dept: FAMILY MEDICINE CLINIC | Facility: CLINIC | Age: 44
End: 2024-10-09
Payer: COMMERCIAL

## 2024-10-09 ENCOUNTER — LAB ENCOUNTER (OUTPATIENT)
Dept: LAB | Age: 44
End: 2024-10-09
Attending: FAMILY MEDICINE
Payer: COMMERCIAL

## 2024-10-09 VITALS
HEART RATE: 96 BPM | WEIGHT: 293 LBS | BODY MASS INDEX: 45.99 KG/M2 | DIASTOLIC BLOOD PRESSURE: 76 MMHG | SYSTOLIC BLOOD PRESSURE: 134 MMHG | HEIGHT: 67 IN | TEMPERATURE: 97 F | RESPIRATION RATE: 16 BRPM

## 2024-10-09 DIAGNOSIS — Z00.00 ANNUAL PHYSICAL EXAM: Primary | ICD-10-CM

## 2024-10-09 DIAGNOSIS — Z00.00 ANNUAL PHYSICAL EXAM: ICD-10-CM

## 2024-10-09 DIAGNOSIS — Z12.11 SCREENING FOR MALIGNANT NEOPLASM OF COLON: ICD-10-CM

## 2024-10-09 DIAGNOSIS — I10 ESSENTIAL HYPERTENSION WITH GOAL BLOOD PRESSURE LESS THAN 130/80: ICD-10-CM

## 2024-10-09 DIAGNOSIS — E66.01 MORBID OBESITY (HCC): ICD-10-CM

## 2024-10-09 LAB
ALBUMIN SERPL-MCNC: 4.4 G/DL (ref 3.2–4.8)
ALBUMIN/GLOB SERPL: 1.2 {RATIO} (ref 1–2)
ALP LIVER SERPL-CCNC: 64 U/L
ALT SERPL-CCNC: 17 U/L
ANION GAP SERPL CALC-SCNC: 4 MMOL/L (ref 0–18)
AST SERPL-CCNC: 18 U/L (ref ?–34)
BASOPHILS # BLD AUTO: 0.07 X10(3) UL (ref 0–0.2)
BASOPHILS NFR BLD AUTO: 1.1 %
BILIRUB SERPL-MCNC: 0.4 MG/DL (ref 0.3–1.2)
BUN BLD-MCNC: 14 MG/DL (ref 9–23)
CALCIUM BLD-MCNC: 9.9 MG/DL (ref 8.7–10.4)
CHLORIDE SERPL-SCNC: 107 MMOL/L (ref 98–112)
CHOLEST SERPL-MCNC: 250 MG/DL (ref ?–200)
CO2 SERPL-SCNC: 26 MMOL/L (ref 21–32)
CREAT BLD-MCNC: 0.93 MG/DL
EGFRCR SERPLBLD CKD-EPI 2021: 77 ML/MIN/1.73M2 (ref 60–?)
EOSINOPHIL # BLD AUTO: 0.21 X10(3) UL (ref 0–0.7)
EOSINOPHIL NFR BLD AUTO: 3.4 %
ERYTHROCYTE [DISTWIDTH] IN BLOOD BY AUTOMATED COUNT: 16.5 %
FASTING PATIENT LIPID ANSWER: YES
FASTING STATUS PATIENT QL REPORTED: YES
GLOBULIN PLAS-MCNC: 3.7 G/DL (ref 2–3.5)
GLUCOSE BLD-MCNC: 99 MG/DL (ref 70–99)
HCT VFR BLD AUTO: 37 %
HDLC SERPL-MCNC: 62 MG/DL (ref 40–59)
HGB BLD-MCNC: 11.9 G/DL
IMM GRANULOCYTES # BLD AUTO: 0.01 X10(3) UL (ref 0–1)
IMM GRANULOCYTES NFR BLD: 0.2 %
LDLC SERPL CALC-MCNC: 171 MG/DL (ref ?–100)
LYMPHOCYTES # BLD AUTO: 3.66 X10(3) UL (ref 1–4)
LYMPHOCYTES NFR BLD AUTO: 59.7 %
MCH RBC QN AUTO: 27.3 PG (ref 26–34)
MCHC RBC AUTO-ENTMCNC: 32.2 G/DL (ref 31–37)
MCV RBC AUTO: 84.9 FL
MONOCYTES # BLD AUTO: 0.58 X10(3) UL (ref 0.1–1)
MONOCYTES NFR BLD AUTO: 9.5 %
NEUTROPHILS # BLD AUTO: 1.6 X10 (3) UL (ref 1.5–7.7)
NEUTROPHILS # BLD AUTO: 1.6 X10(3) UL (ref 1.5–7.7)
NEUTROPHILS NFR BLD AUTO: 26.1 %
NONHDLC SERPL-MCNC: 188 MG/DL (ref ?–130)
OSMOLALITY SERPL CALC.SUM OF ELEC: 285 MOSM/KG (ref 275–295)
PLATELET # BLD AUTO: 256 10(3)UL (ref 150–450)
POTASSIUM SERPL-SCNC: 3.7 MMOL/L (ref 3.5–5.1)
PROT SERPL-MCNC: 8.1 G/DL (ref 5.7–8.2)
RBC # BLD AUTO: 4.36 X10(6)UL
SODIUM SERPL-SCNC: 137 MMOL/L (ref 136–145)
TRIGL SERPL-MCNC: 98 MG/DL (ref 30–149)
TSI SER-ACNC: 3.02 MIU/ML (ref 0.55–4.78)
VLDLC SERPL CALC-MCNC: 19 MG/DL (ref 0–30)
WBC # BLD AUTO: 6.1 X10(3) UL (ref 4–11)

## 2024-10-09 PROCEDURE — 99396 PREV VISIT EST AGE 40-64: CPT | Performed by: FAMILY MEDICINE

## 2024-10-09 PROCEDURE — 80061 LIPID PANEL: CPT

## 2024-10-09 PROCEDURE — 3008F BODY MASS INDEX DOCD: CPT | Performed by: FAMILY MEDICINE

## 2024-10-09 PROCEDURE — 84443 ASSAY THYROID STIM HORMONE: CPT

## 2024-10-09 PROCEDURE — 3078F DIAST BP <80 MM HG: CPT | Performed by: FAMILY MEDICINE

## 2024-10-09 PROCEDURE — 80053 COMPREHEN METABOLIC PANEL: CPT

## 2024-10-09 PROCEDURE — 85025 COMPLETE CBC W/AUTO DIFF WBC: CPT

## 2024-10-09 PROCEDURE — 3075F SYST BP GE 130 - 139MM HG: CPT | Performed by: FAMILY MEDICINE

## 2024-10-09 RX ORDER — NIFEDIPINE 90 MG/1
90 TABLET, FILM COATED, EXTENDED RELEASE ORAL DAILY
Qty: 90 TABLET | Refills: 1 | Status: SHIPPED | OUTPATIENT
Start: 2024-10-09

## 2024-10-09 NOTE — PROGRESS NOTES
HPI:   Lita Hernandes is a 44 year old female who presents for a complete physical exam. Symptoms: denies discharge, itching, burning or dysuria, periods are regular. Patient complains of nothing today.  Patient presents for recheck of her hypertension. Pt has been taking medications as instructed, no medication side effects, home BP monitoring in the range of 120's systolic and 80's diastolic.  Denies any other recent illnesses or hospitalizations.  Denies any family history of colon cancer.  States that her maternal aunt has breast cancer.  States that mom passed away when she was younger from liver cancer secondary to chronic hepatitis C infection.  Patient is due for screening colonoscopy.  Mammogram is up to date.       Wt Readings from Last 6 Encounters:   10/09/24 296 lb 9.6 oz (134.5 kg)   07/12/24 288 lb (130.6 kg)   07/01/24 288 lb 9.6 oz (130.9 kg)   05/15/24 293 lb (132.9 kg)   02/08/24 295 lb (133.8 kg)   01/31/24 288 lb 12.8 oz (131 kg)     Body mass index is 46.45 kg/m².     Results for orders placed or performed during the hospital encounter of 07/12/24   EKG    Collection Time: 07/12/24  8:47 PM   Result Value Ref Range    Ventricular rate 95 BPM    Atrial rate 95 BPM    P-R Interval 176 ms    QRS Duration 96 ms    Q-T Interval 350 ms    QTC Calculation (Bezet) 439 ms    P Axis 40 degrees    R Axis 70 degrees    T Axis 18 degrees   D-Dimer    Collection Time: 07/12/24  9:12 PM   Result Value Ref Range    D-Dimer 0.36 <0.50 ug/mL FEU   RAINBOW DRAW LAVENDER    Collection Time: 07/12/24  9:12 PM   Result Value Ref Range    Hold Lavender Auto Resulted    RAINBOW DRAW BLUE    Collection Time: 07/12/24  9:12 PM   Result Value Ref Range    Hold Blue Auto Resulted    CBC W/ DIFFERENTIAL    Collection Time: 07/12/24  9:12 PM   Result Value Ref Range    WBC 7.4 4.0 - 11.0 x10(3) uL    RBC 4.08 3.80 - 5.30 x10(6)uL    HGB 11.1 (L) 12.0 - 16.0 g/dL    HCT 33.9 (L) 35.0 - 48.0 %    .0 150.0 -  450.0 10(3)uL    MCV 83.1 80.0 - 100.0 fL    MCH 27.2 26.0 - 34.0 pg    MCHC 32.7 31.0 - 37.0 g/dL    RDW 16.6 %    Neutrophil Absolute Prelim 2.60 1.50 - 7.70 x10 (3) uL    Neutrophil Absolute 2.60 1.50 - 7.70 x10(3) uL    Lymphocyte Absolute 3.96 1.00 - 4.00 x10(3) uL    Monocyte Absolute 0.49 0.10 - 1.00 x10(3) uL    Eosinophil Absolute 0.22 0.00 - 0.70 x10(3) uL    Basophil Absolute 0.09 0.00 - 0.20 x10(3) uL    Immature Granulocyte Absolute 0.02 0.00 - 1.00 x10(3) uL    Neutrophil % 35.2 %    Lymphocyte % 53.7 %    Monocyte % 6.6 %    Eosinophil % 3.0 %    Basophil % 1.2 %    Immature Granulocyte % 0.3 %   Comp Metabolic Panel (14)    Collection Time: 07/12/24  9:13 PM   Result Value Ref Range    Glucose 123 (H) 70 - 99 mg/dL    Sodium 138 136 - 145 mmol/L    Potassium 3.7 3.5 - 5.1 mmol/L    Chloride 105 98 - 112 mmol/L    CO2 28.0 21.0 - 32.0 mmol/L    Anion Gap 5 0 - 18 mmol/L    BUN 15 9 - 23 mg/dL    Creatinine 1.00 0.55 - 1.02 mg/dL    Calcium, Total 9.1 8.5 - 10.1 mg/dL    Calculated Osmolality 288 275 - 295 mOsm/kg    eGFR-Cr 71 >=60 mL/min/1.73m2    AST 14 (L) 15 - 37 U/L    ALT 25 13 - 56 U/L    Alkaline Phosphatase 74 37 - 98 U/L    Bilirubin, Total 0.3 0.1 - 2.0 mg/dL    Total Protein 8.4 (H) 6.4 - 8.2 g/dL    Albumin 3.5 3.4 - 5.0 g/dL    Globulin  4.9 (H) 2.8 - 4.4 g/dL    A/G Ratio 0.7 (L) 1.0 - 2.0   Troponin I (High Sensitivity)    Collection Time: 07/12/24  9:13 PM   Result Value Ref Range    Troponin I (High Sensitivity) 29 <=54 ng/L   HCG, Beta Subunit (Quant Pregnancy Test)    Collection Time: 07/12/24  9:13 PM   Result Value Ref Range    Hcg Quantitative <1.0 <=3.0 mIU/mL   Magnesium    Collection Time: 07/12/24  9:13 PM   Result Value Ref Range    Magnesium 2.0 1.6 - 2.6 mg/dL   Pro Beta Natriuretic Peptide    Collection Time: 07/12/24  9:13 PM   Result Value Ref Range    Pro-Beta Natriuretic Peptide 23 <125 pg/mL   RAINBOW DRAW LIGHT GREEN    Collection Time: 07/12/24  9:13 PM   Result  Value Ref Range    Hold Lt Green Auto Resulted    SARS-CoV-2/Flu A and B/RSV by PCR (GeneXpert)    Collection Time: 07/12/24 10:35 PM    Specimen: Nares; Other   Result Value Ref Range    SARS-CoV-2 (COVID-19) - (GeneXpert) Not Detected Not Detected    Influenza A by PCR Negative Negative    Influenza B by PCR Negative Negative    RSV by PCR Negative Negative       Current Outpatient Medications   Medication Sig Dispense Refill    NIFEdipine ER 90 MG Oral Tablet 24 Hr Take 1 tablet (90 mg total) by mouth daily. 90 tablet 1    NON FORMULARY Pumpkin seed oil 2 capsule po daily  Saw palmetto 1 tablet po daily  Spearmint leaf 1 tablet po daily  Black pepper /turmeric 1 tablet po daily      magnesium 250 MG Oral Tab Take 1 tablet (250 mg total) by mouth daily.      cholecalciferol 50 MCG (2000 UT) Oral Cap Take 1 capsule (2,000 Units total) by mouth daily.      ibuprofen 600 MG Oral Tab Take 1 tablet (600 mg total) by mouth every 6 (six) hours as needed for Pain. For first 5 days of menstrual cycle      Fluocinolone Acetonide Scalp 0.01 % External Oil APPLY TO SCALP AND LEAVE ON OVERNIGHT OR FOR MORE THAN 4 HOURS BEFORE WASHING      ketoconazole 2 % External Cream APPLY TOPICALLY TO THE AFFECTED AREA EVERY DAY BEFORE NOON      ketoconazole 2 % External Shampoo         Allergies   Allergen Reactions    Penicillins HIVES     HIVES AS A CHILD      Past Medical History:    Anemia    BRCA gene mutation negative in female    Negative 48 gene hereditary cancer panel, x1 VUS, report scanned into media tab    Essential hypertension    High blood pressure    Pleurisy    Sleep apnea    used CPAP before losing weight, no longer needs it    Visual impairment    glasses      Past Surgical History:   Procedure Laterality Date    Other surgical history      cervical polyp at the age of 30s    Other surgical history      fibroids    [place] teeth removed        Family History   Problem Relation Age of Onset    Cancer Mother         liver  cancer dx age 62    Diabetes Mother     Other (TVH) Mother 35    Other (Hep C pos.) Mother     Diabetes Father     Hypertension Father     Skin cancer Paternal Grandmother         dx age 60s    Breast Cancer Maternal Aunt 60    Breast Cancer Other 39        cousin    Ovarian Cancer Neg     Uterine Cancer Neg     Pancreatic Cancer Neg     Colon Cancer Neg     Prostate Cancer Neg     Endometriosis Neg     Infertility Neg       Social History:   Social History     Socioeconomic History    Marital status:    Tobacco Use    Smoking status: Some Days     Types: Cigarettes    Smokeless tobacco: Never    Tobacco comments:     2 CIGS A DAY   Vaping Use    Vaping status: Never Used   Substance and Sexual Activity    Alcohol use: Yes     Comment: 1-2 drinks per month    Drug use: Never    Sexual activity: Yes     Partners: Male     Social Drivers of Health     Physical Activity: Inactive (3/22/2021)    Received from [facility]    Exercise Vital Sign     Days of Exercise per Week: 0 days     Minutes of Exercise per Session: 0 min     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 Children: none.   Exercise:  twice per week.  Diet: watches fats closely, watches sugar closely and watches calories closely     REVIEW OF SYSTEMS:   GENERAL: feels well otherwise  SKIN: denies any unusual skin lesions  EYES:denies blurred vision or double vision  HEENT: denies nasal congestion, sinus pain or ST  LUNGS: denies shortness of breath with exertion, denies cough  CARDIOVASCULAR: denies chest pain on exertion or at rest, denies palpitations  GI: denies abdominal pain,denies heartburn, denies n/v/d/c/blood in stool  : denies dysuria, vaginal discharge or itching,periods regular   MUSCULOSKELETAL: denies back pain  NEURO: denies headaches, denies LH/dizziness/syncope  PSYCHE: denies depression or anxiety  HEMATOLOGIC: denies hx of anemia  ENDOCRINE: denies thyroid history  ALL/ASTHMA: denies hx of allergy or  asthma    EXAM:   /76   Pulse 96   Temp 97.3 °F (36.3 °C) (Temporal)   Resp 16   Ht 5' 7\" (1.702 m)   Wt 296 lb 9.6 oz (134.5 kg)   LMP 09/21/2024 (Exact Date)   BMI 46.45 kg/m²   Body mass index is 46.45 kg/m².   GENERAL: well developed, well nourished,in no apparent distress  SKIN: no rashes,no suspicious lesions  HEENT: atraumatic, normocephalic,ears and throat are clear  EYES:PERRLA, EOMI, normal optic disk,conjunctiva are clear  NECK: supple,no adenopathy  BREAST:DEFERRED TO GYNE  LUNGS: clear to auscultation; no rhonchi, rales, or wheezing  CARDIO: RRR without murmur  GI: good BS's,no masses, HSM or tenderness  :DEFERRED TO GYNE   MUSCULOSKELETAL: back is not tender,FROM of the back  EXTREMITIES: no cyanosis, clubbing or edema  NEURO: Oriented times three,cranial nerves are intact,motor and sensory are grossly intact; 2+ knee reflexes bilaterally  VASCULAR: 2 + dorsalis pedal pulses bilaterally    ASSESSMENT AND PLAN:   Lita Hernandes is a 44 year old female who presents for a complete physical exam.   Pap and pelvic deferred to gyne.   Mammogram up to date  Health maintenance, will check:   Orders Placed This Encounter   Procedures    CBC With Differential With Platelet    Comp Metabolic Panel (14)    Lipid Panel    Urinalysis, Routine    TSH W Reflex To Free T4    TdaP (Adacel, Boostrix) [81449]     HTN - controlled, cpm, check renal function    GI referral provided for screening colonoscopy    Tdap given today    Advised to have ultrafast, and 5 minute heart aware.  Advised patient to check with insurance company for coverage prior to doing the test.     Morbid obesity - discussed GLP-1 agonist and check with insurance for coverage, discussed diet and exercise, Pt' s weight is Body mass index is 46.45 kg/m²., recommended low fat diet and aerobic exercise 30 minutes three times weekly.  The patient indicates understanding of these issues and agrees to the plan.    Encounter Diagnoses    Name Primary?    Annual physical exam Yes    Essential hypertension with goal blood pressure less than 130/80     Morbid obesity (HCC)     Screening for malignant neoplasm of colon        Meds & Refills for this Visit:  Requested Prescriptions     Signed Prescriptions Disp Refills    NIFEdipine ER 90 MG Oral Tablet 24 Hr 90 tablet 1     Sig: Take 1 tablet (90 mg total) by mouth daily.       Imaging & Consults:  GASTRO - INTERNAL  TETANUS, DIPHTHERIA TOXOIDS AND ACELLULAR PERTUSIS VACCINE (TDAP), >7 YEARS, IM USE    The patient is asked to return in 6 months pending lab results.

## 2024-10-10 ENCOUNTER — LAB ENCOUNTER (OUTPATIENT)
Dept: LAB | Age: 44
End: 2024-10-10
Attending: FAMILY MEDICINE
Payer: COMMERCIAL

## 2024-10-10 LAB
BILIRUB UR QL STRIP.AUTO: NEGATIVE
CLARITY UR REFRACT.AUTO: CLEAR
GLUCOSE UR STRIP.AUTO-MCNC: NORMAL MG/DL
KETONES UR STRIP.AUTO-MCNC: NEGATIVE MG/DL
LEUKOCYTE ESTERASE UR QL STRIP.AUTO: NEGATIVE
NITRITE UR QL STRIP.AUTO: NEGATIVE
PH UR STRIP.AUTO: 6.5 [PH] (ref 5–8)
PROT UR STRIP.AUTO-MCNC: NEGATIVE MG/DL
RBC UR QL AUTO: NEGATIVE
SP GR UR STRIP.AUTO: 1.01 (ref 1–1.03)
UROBILINOGEN UR STRIP.AUTO-MCNC: NORMAL MG/DL

## 2024-10-10 PROCEDURE — 81003 URINALYSIS AUTO W/O SCOPE: CPT

## 2024-11-05 DIAGNOSIS — Z13.6 SCREENING FOR CARDIOVASCULAR CONDITION: Primary | ICD-10-CM

## 2024-11-15 ENCOUNTER — HOSPITAL ENCOUNTER (OUTPATIENT)
Dept: CT IMAGING | Age: 44
Discharge: HOME | End: 2024-11-15
Attending: FAMILY MEDICINE

## 2024-11-15 DIAGNOSIS — Z13.6 SCREENING FOR CARDIOVASCULAR CONDITION: ICD-10-CM

## 2024-11-25 ENCOUNTER — HOSPITAL ENCOUNTER (OUTPATIENT)
Facility: HOSPITAL | Age: 44
Setting detail: HOSPITAL OUTPATIENT SURGERY
Discharge: HOME | End: 2024-11-25
Attending: INTERNAL MEDICINE | Admitting: INTERNAL MEDICINE
Payer: COMMERCIAL

## 2024-11-25 ENCOUNTER — ANESTHESIA EVENT (OUTPATIENT)
Dept: ENDOSCOPY | Facility: HOSPITAL | Age: 44
End: 2024-11-25
Payer: COMMERCIAL

## 2024-11-25 ENCOUNTER — ANESTHESIA (OUTPATIENT)
Dept: ENDOSCOPY | Facility: HOSPITAL | Age: 44
End: 2024-11-25
Payer: COMMERCIAL

## 2024-11-25 VITALS
BODY MASS INDEX: 45.52 KG/M2 | OXYGEN SATURATION: 99 % | DIASTOLIC BLOOD PRESSURE: 81 MMHG | HEART RATE: 68 BPM | HEIGHT: 67 IN | TEMPERATURE: 99 F | RESPIRATION RATE: 15 BRPM | WEIGHT: 290 LBS | SYSTOLIC BLOOD PRESSURE: 155 MMHG

## 2024-11-25 DIAGNOSIS — Z12.11 COLON CANCER SCREENING: ICD-10-CM

## 2024-11-25 LAB — B-HCG UR QL: NEGATIVE

## 2024-11-25 PROCEDURE — 88305 TISSUE EXAM BY PATHOLOGIST: CPT | Performed by: INTERNAL MEDICINE

## 2024-11-25 PROCEDURE — 0DBL8ZX EXCISION OF TRANSVERSE COLON, VIA NATURAL OR ARTIFICIAL OPENING ENDOSCOPIC, DIAGNOSTIC: ICD-10-PCS | Performed by: INTERNAL MEDICINE

## 2024-11-25 PROCEDURE — 81025 URINE PREGNANCY TEST: CPT

## 2024-11-25 PROCEDURE — 0DBK8ZX EXCISION OF ASCENDING COLON, VIA NATURAL OR ARTIFICIAL OPENING ENDOSCOPIC, DIAGNOSTIC: ICD-10-PCS | Performed by: INTERNAL MEDICINE

## 2024-11-25 RX ORDER — SODIUM CHLORIDE, SODIUM LACTATE, POTASSIUM CHLORIDE, CALCIUM CHLORIDE 600; 310; 30; 20 MG/100ML; MG/100ML; MG/100ML; MG/100ML
INJECTION, SOLUTION INTRAVENOUS CONTINUOUS
Status: DISCONTINUED | OUTPATIENT
Start: 2024-11-25 | End: 2024-11-25

## 2024-11-25 RX ADMIN — SODIUM CHLORIDE, SODIUM LACTATE, POTASSIUM CHLORIDE, CALCIUM CHLORIDE: 600; 310; 30; 20 INJECTION, SOLUTION INTRAVENOUS at 13:15:00

## 2024-11-25 NOTE — ANESTHESIA POSTPROCEDURE EVALUATION
[facility]    Lita Hernandes Patient Status:  Hospital Outpatient Surgery   Age/Gender 44 year old female MRN [id number]   Location [facility] Attending Gibran Reinoso DO   Hosp Day # 0 PCP ROWDY CHUNG DO       Anesthesia Post-op Note    COLONOSCOPY with cold snare polypectomy    Procedure Summary       Date: 11/25/24 Room / Location:  ENDOSCOPY 02 /  ENDOSCOPY    Anesthesia Start: 1315 Anesthesia Stop: 1335    Procedure: COLONOSCOPY with cold snare polypectomy Diagnosis:       Colon cancer screening      (Polyps, diverticulosis, hemorrhoids)    Surgeons: Gibran Reinoso DO Anesthesiologist: Mayra Shepard MD    Anesthesia Type: MAC ASA Status: 3            Anesthesia Type: MAC    Vitals Value Taken Time   /81 11/25/24 1345   Temp  11/25/24 1348   Pulse 75 11/25/24 1348   Resp 16 11/25/24 1345   SpO2 98 % 11/25/24 1348   Vitals shown include unfiled device data.    Patient Location: Endoscopy    Anesthesia Type: MAC    Airway Patency: patent    Postop Pain Control: adequate    Mental Status: preanesthetic baseline    Nausea/Vomiting: none    Cardiopulmonary/Hydration status: stable euvolemic    Complications: no apparent anesthesia related complications    Postop vital signs: stable    Dental Exam: Unchanged from Preop    Patient to be discharged from PACU when criteria met.

## 2024-11-25 NOTE — OPERATIVE REPORT
Lita Hernandes Patient Status:  Hospital Outpatient Surgery    1980 MRN [id number]   [facility] ENDOSCOPY PAIN CENTER Attending Gibran Reinoso DO   Hosp Day # 0 PCP ROWDY CHUNG DO       PREOPERATIVE DIAGNOSIS/INDICATION: Colorectal Cancer Screen  POSTOPERTATIVE DIAGNOSIS: See Impression  PROCEDURE PERFORMED: COLONOSCOPY with SNARE  DATE: 24  SEDATION: MAC sedation provided by General Anesthesia    TIME OUT WAS PERFORMED    INFORMED CONSENT: I have discussed the risks, benefits, and alternatives to colonoscopy with the patient including but not limited to the risks of bleeding, infection, pain, as well as the risks of anesthesia and perforation all possibly leading to prolonged hospitalization, surgical intervention. I explained that 5-10 % of polyps may be missed even in the best of all circumstances. All questions were answered to the patient’s satisfaction. The patient elected to proceed with colonoscopy with intervention as indicated.  PROCEDURE DESCRIPTION: After careful digital rectal examination a  colonoscope was introduced into the patients rectum, advanced beyond the recto sigmoid junction, into the descending colon, splenic flexure, transverse colon, hepatic flexure, ascending colon, cecum and the last 5-10 cm of the terminal ileum, confirmed by landmarks, including the appendiceal orifice and ileocecal valve. Careful examination of the above described areas was performed on withdrawal of the endoscope. Retroflexion was performed in the rectum. The patient tolerated the procedure well.  There were no immediate complications immediately following the procedure.  The patient was transferred to recovery in stable condition.  QUALITY OF PREPARATION: [place] Bowel Preparation Scale:    Right colon 2, Transverse colon 2, Left colon 2   FINDINGS:  Terminal Ileum: Normal mucosa  Cecum: The mucosa and vascular pattern were normal.   Ascending colon: 7 mm sessile  polyp s/p cold snare polypectomy.   Transverse colon: 3 mm sessile polyp s/p cold snare polypectomy.   Descending colon: The mucosa and vascular pattern were normal.  Sigmoid colon: Diverticulosis.   Rectum: The mucosa and vascular pattern were normal. Retroflexion revealed internal hemorrhoids.     IMPRESSION:   1. Colon Polyps.   2. Diverticulosis.   3. Internal Hemorrhoids.      RECOMMENDATIONS:   1. Await pathology results.    2. Repeat colonoscopy in 7 years.       [facility]  Gastroenterology/Advanced Endoscopy  [facility] Gastroenterology, [facility].

## 2024-11-25 NOTE — DISCHARGE INSTRUCTIONS
Home Care Instructions for Colonoscopy with Sedation    Diet:  - Resume your regular diet as tolerated unless otherwise instructed.  - Start with light meals to minimize bloating.  - Do not drink alcohol today.    Medication:  - If you have questions about resuming your normal medications, please contact your Primary Care Physician.    Activities:  - Take it easy today. Do not return to work today.  - Do not drive today.  - Do not operate any machinery today (including kitchen equipment).    Colonoscopy:  - You may notice some rectal \"spotting\" (a little blood on the toilet tissue) for a day or two after the exam. This is normal.  - If you experience any rectal bleeding (not spotting), persistent tenderness or sharp severe abdominal pains, oral temperature over 100 degrees Fahrenheit, light-headedness or dizziness, or any other problems, contact your doctor.        **If unable to reach your doctor, please go to the [facility] Emergency Room**    - Your referring physician will receive a full report of your examination.  - If you do not hear from your doctor's office within two weeks of your biopsy, please call them for your results.    You may be able to see your laboratory results in Other Machine between 4 and 7 business days.  In some cases, your physician may not have viewed the results before they are released to Other Machine.  If you have questions regarding your results contact the physician who ordered the test/exam by phone or via Other Machine by choosing \"Ask a Medical Question.\"

## 2024-11-25 NOTE — H&P
History & Physical Examination    Patient Name: Lita Hernandes  MRN: [id number]  CSN: [id number]  YOB: 1980    Diagnosis: colorectal cancer screen    Present Illness: 45 y/o F history as above presents for Colonoscopy.     Prescriptions Prior to Admission[1]  Current Facility-Administered Medications   Medication Dose Route Frequency    lactated ringers infusion   Intravenous Continuous       Allergies: Allergies[2]    Past Medical History:    Anemia    BRCA gene mutation negative in female    Negative 48 gene hereditary cancer panel, x1 VUS, report scanned into media tab    Essential hypertension    High blood pressure    Pleurisy    Sleep apnea    used CPAP before losing weight, no longer needs it    Visual impairment    glasses     Past Surgical History:   Procedure Laterality Date    Other surgical history      cervical polyp at the age of 29s    Other surgical history      fibroids    [place] teeth removed       Family History   Problem Relation Age of Onset    Cancer Mother         liver cancer dx age 62    Diabetes Mother     Other (TVH) Mother 35    Other (Hep C pos.) Mother     Diabetes Father     Hypertension Father     Skin cancer Paternal Grandmother         dx age 60s    Breast Cancer Maternal Aunt 60    Breast Cancer Other 39        cousin    Ovarian Cancer Neg     Uterine Cancer Neg     Pancreatic Cancer Neg     Colon Cancer Neg     Prostate Cancer Neg     Endometriosis Neg     Infertility Neg      Social History     Tobacco Use    Smoking status: Every Day     Types: Cigarettes    Smokeless tobacco: Never    Tobacco comments:     3-5 cigarettes per day   Substance Use Topics    Alcohol use: Yes     Comment: once every few months       SYSTEM Check if Review is Normal Check if Physical Exam is Normal If not normal, please explain:   HEENT [ x] [x ]    NECK & BACK [ x] [ x]    HEART [ x] [x ]    LUNGS [ x] [ x]    ABDOMEN [ x] [x ]    UROGENITAL [ n/a] [ n/a]    EXTREMITIES [ x] [x  ]    OTHER        [ x ] I have discussed the risks and benefits and alternatives with the patient/family.  They understand and agree to proceed with plan of care.  [ x ] I have reviewed the History and Physical done within the last 30 days.  Any changes noted above.    Gibran Renioso DO  11/25/2024  1:13 PM             [1]   Medications Prior to Admission   Medication Sig Dispense Refill Last Dose/Taking    PEG 3350-KCl-NaBcb-NaCl-NaSulf (PEG-3350/ELECTROLYTES) 236 g Oral Recon Soln Take as directed by physician 4000 mL 0 Taking    NIFEdipine ER 90 MG Oral Tablet 24 Hr Take 1 tablet (90 mg total) by mouth daily. 90 tablet 1 11/25/2024    NON FORMULARY Pumpkin seed oil 2 capsule po daily  Saw palmetto 1 tablet po daily  Spearmint leaf 1 tablet po daily  Black pepper /turmeric 1 tablet po daily   Past Month    magnesium 250 MG Oral Tab Take 1 tablet (250 mg total) by mouth daily as needed.   Past Month    cholecalciferol 50 MCG (2000 UT) Oral Cap Take 1 capsule (2,000 Units total) by mouth daily.   Past Month    ibuprofen 600 MG Oral Tab Take 1 tablet (600 mg total) by mouth every 6 (six) hours as needed for Pain. For first 5 days of menstrual cycle   Taking As Needed    Fluocinolone Acetonide Scalp 0.01 % External Oil as needed.   Taking As Needed    ketoconazole 2 % External Cream Apply topically daily as needed.   Taking As Needed    ketoconazole 2 % External Shampoo Apply topically as needed.   Taking As Needed   [2]   Allergies  Allergen Reactions    Penicillins HIVES     HIVES AS A CHILD

## 2024-11-25 NOTE — ANESTHESIA PREPROCEDURE EVALUATION
PRE-OP EVALUATION    Patient Name: Lita Hernandes    Admit Diagnosis: Colon cancer screening [Z12.11]    Pre-op Diagnosis: Colon cancer screening [Z12.11]    COLONOSCOPY    Anesthesia Procedure: COLONOSCOPY    Surgeons and Role:     * Gibran Reinoso, DO - Primary    Pre-op vitals reviewed.        Body mass index is 45.42 kg/m².    Current medications reviewed.  Hospital Medications:  No current facility-administered medications on file as of .       Outpatient Medications:   Prescriptions Prior to Admission[1]    Allergies: Penicillins      Anesthesia Evaluation    Patient summary reviewed.    Anesthetic Complications           GI/Hepatic/Renal    Negative GI/hepatic/renal ROS.                             Cardiovascular                  (+) hypertension                                     Endo/Other    Negative endo/other ROS.                              Pulmonary                    (+) sleep apnea and noncompliant      Neuro/Psych    Negative neuro/psych ROS.                                  Past Surgical History:   Procedure Laterality Date    Other surgical history      cervical polyp at the age of 30s    Other surgical history      fibroids    [place] teeth removed       Social History     Socioeconomic History    Marital status:    Tobacco Use    Smoking status: Every Day     Types: Cigarettes    Smokeless tobacco: Never    Tobacco comments:     3-5 cigarettes per day   Vaping Use    Vaping status: Never Used   Substance and Sexual Activity    Alcohol use: Yes     Comment: once every few months    Drug use: Never    Sexual activity: Yes     Partners: Male     History   Drug Use Unknown     Available pre-op labs reviewed.  Lab Results   Component Value Date    WBC 6.1 10/09/2024    RBC 4.36 10/09/2024    HGB 11.9 (L) 10/09/2024    HCT 37.0 10/09/2024    MCV 84.9 10/09/2024    MCH 27.3 10/09/2024    MCHC 32.2 10/09/2024    RDW 16.5 10/09/2024    .0 10/09/2024     Lab Results   Component  Value Date     10/09/2024    K 3.7 10/09/2024     10/09/2024    CO2 26.0 10/09/2024    BUN 14 10/09/2024    CREATSERUM 0.93 10/09/2024    GLU 99 10/09/2024    CA 9.9 10/09/2024            Airway      Mallampati: II  Mouth opening: >3 FB  TM distance: 4 - 6 cm  Neck ROM: full Cardiovascular      Rhythm: regular  Rate: normal     Dental             Pulmonary    Pulmonary exam normal.                 Other findings  Crown left  lower quadrant            ASA: 3   Plan: MAC  NPO status verified and           Plan/risks discussed with: patient                Present on Admission:  **None**             [1]   No medications prior to admission.

## 2025-03-14 PROCEDURE — 88305 TISSUE EXAM BY PATHOLOGIST: CPT | Performed by: STUDENT IN AN ORGANIZED HEALTH CARE EDUCATION/TRAINING PROGRAM

## 2025-03-18 ENCOUNTER — LAB REQUISITION (OUTPATIENT)
Dept: LAB | Facility: HOSPITAL | Age: 45
End: 2025-03-18
Payer: COMMERCIAL

## 2025-03-18 DIAGNOSIS — D44.7 NEOPLASM OF UNCERTAIN BEHAVIOR OF AORTIC BODY AND OTHER PARAGANGLIA (HCC): ICD-10-CM

## 2025-05-16 ENCOUNTER — TELEPHONE (OUTPATIENT)
Dept: FAMILY MEDICINE CLINIC | Facility: CLINIC | Age: 45
End: 2025-05-16

## 2025-05-16 DIAGNOSIS — I10 ESSENTIAL HYPERTENSION WITH GOAL BLOOD PRESSURE LESS THAN 130/80: ICD-10-CM

## 2025-05-16 RX ORDER — NIFEDIPINE 90 MG/1
90 TABLET, FILM COATED, EXTENDED RELEASE ORAL DAILY
Qty: 90 TABLET | Refills: 1 | Status: CANCELLED | OUTPATIENT
Start: 2025-05-16

## 2025-05-19 NOTE — TELEPHONE ENCOUNTER
LOV:10/09/2024      NOV:was due in April. No future appointment has been scheduled to date.     Medication: Nifedipine ER 90 mg         MCM sent to pt.to call and make an appointment for a follow up. She was due in April

## 2025-05-22 ENCOUNTER — OFFICE VISIT (OUTPATIENT)
Dept: FAMILY MEDICINE CLINIC | Facility: CLINIC | Age: 45
End: 2025-05-22
Payer: COMMERCIAL

## 2025-05-22 ENCOUNTER — LAB ENCOUNTER (OUTPATIENT)
Dept: LAB | Age: 45
End: 2025-05-22
Attending: FAMILY MEDICINE
Payer: COMMERCIAL

## 2025-05-22 VITALS
HEIGHT: 67 IN | BODY MASS INDEX: 45.99 KG/M2 | HEART RATE: 83 BPM | OXYGEN SATURATION: 96 % | SYSTOLIC BLOOD PRESSURE: 132 MMHG | WEIGHT: 293 LBS | DIASTOLIC BLOOD PRESSURE: 74 MMHG | TEMPERATURE: 97 F | RESPIRATION RATE: 18 BRPM

## 2025-05-22 DIAGNOSIS — I10 ESSENTIAL HYPERTENSION WITH GOAL BLOOD PRESSURE LESS THAN 130/80: ICD-10-CM

## 2025-05-22 DIAGNOSIS — E78.2 MIXED HYPERLIPIDEMIA: ICD-10-CM

## 2025-05-22 DIAGNOSIS — I10 ESSENTIAL HYPERTENSION WITH GOAL BLOOD PRESSURE LESS THAN 130/80: Primary | ICD-10-CM

## 2025-05-22 DIAGNOSIS — E66.01 MORBID OBESITY (HCC): ICD-10-CM

## 2025-05-22 LAB
ALBUMIN SERPL-MCNC: 4.7 G/DL (ref 3.2–4.8)
ALBUMIN/GLOB SERPL: 1.4 {RATIO} (ref 1–2)
ALP LIVER SERPL-CCNC: 60 U/L (ref 37–98)
ALT SERPL-CCNC: 17 U/L (ref 10–49)
ANION GAP SERPL CALC-SCNC: 8 MMOL/L (ref 0–18)
AST SERPL-CCNC: 19 U/L (ref ?–34)
BILIRUB SERPL-MCNC: 0.6 MG/DL (ref 0.3–1.2)
BUN BLD-MCNC: 14 MG/DL (ref 9–23)
CALCIUM BLD-MCNC: 9.7 MG/DL (ref 8.7–10.6)
CHLORIDE SERPL-SCNC: 106 MMOL/L (ref 98–112)
CHOLEST SERPL-MCNC: 249 MG/DL (ref ?–200)
CO2 SERPL-SCNC: 26 MMOL/L (ref 21–32)
CREAT BLD-MCNC: 1.1 MG/DL (ref 0.55–1.02)
EGFRCR SERPLBLD CKD-EPI 2021: 63 ML/MIN/1.73M2 (ref 60–?)
FASTING PATIENT LIPID ANSWER: YES
FASTING STATUS PATIENT QL REPORTED: YES
GLOBULIN PLAS-MCNC: 3.4 G/DL (ref 2–3.5)
GLUCOSE BLD-MCNC: 100 MG/DL (ref 70–99)
HDLC SERPL-MCNC: 58 MG/DL (ref 40–59)
LDLC SERPL CALC-MCNC: 174 MG/DL (ref ?–100)
NONHDLC SERPL-MCNC: 191 MG/DL (ref ?–130)
OSMOLALITY SERPL CALC.SUM OF ELEC: 291 MOSM/KG (ref 275–295)
POTASSIUM SERPL-SCNC: 4.2 MMOL/L (ref 3.5–5.1)
PROT SERPL-MCNC: 8.1 G/DL (ref 5.7–8.2)
SODIUM SERPL-SCNC: 140 MMOL/L (ref 136–145)
TRIGL SERPL-MCNC: 96 MG/DL (ref 30–149)
VLDLC SERPL CALC-MCNC: 19 MG/DL (ref 0–30)

## 2025-05-22 PROCEDURE — 80053 COMPREHEN METABOLIC PANEL: CPT

## 2025-05-22 PROCEDURE — 3008F BODY MASS INDEX DOCD: CPT | Performed by: FAMILY MEDICINE

## 2025-05-22 PROCEDURE — G2211 COMPLEX E/M VISIT ADD ON: HCPCS | Performed by: FAMILY MEDICINE

## 2025-05-22 PROCEDURE — 36415 COLL VENOUS BLD VENIPUNCTURE: CPT

## 2025-05-22 PROCEDURE — 99214 OFFICE O/P EST MOD 30 MIN: CPT | Performed by: FAMILY MEDICINE

## 2025-05-22 PROCEDURE — 3078F DIAST BP <80 MM HG: CPT | Performed by: FAMILY MEDICINE

## 2025-05-22 PROCEDURE — 3075F SYST BP GE 130 - 139MM HG: CPT | Performed by: FAMILY MEDICINE

## 2025-05-22 PROCEDURE — 80061 LIPID PANEL: CPT

## 2025-05-22 RX ORDER — NIFEDIPINE 90 MG/1
90 TABLET, FILM COATED, EXTENDED RELEASE ORAL DAILY
Qty: 90 TABLET | Refills: 1 | Status: SHIPPED | OUTPATIENT
Start: 2025-05-22

## 2025-05-22 NOTE — PROGRESS NOTES
[facility] Family Medicine Office Note  Chief Complaint:   Chief Complaint   Patient presents with    Medication Follow-Up     Blood pressure        HPI:   This is a 44 year old female coming in for:    HTN - Patient presents for recheck of her hypertension. Pt has been taking medications as instructed, no medication side effects, home BP monitoring in the range of 130's systolic and 70's diastolic.  HL -patient is due for repeat lipid panel.  She is currently not on any medications to lower her cholesterol.  States her father did have heart disease.  She is trying to change her diet and watch her fat intake now that she has moved into her home and is cooking at home again.  She admits she gained quite a bit of weight over the last few months as she was trying to buy a new house and was living out of a hotel and eating a lot of fast food.      Past Medical History[1]  Past Surgical History[2]  Social History:  Short Social Hx on File[3]  Family History:  Family History[4]  Allergies:  Allergies[5]  Current Meds:  Current Medications[6]   Ready to quit: Not Answered  Counseling given: Not Answered  Tobacco comments: 3-5 cigarettes per day       REVIEW OF SYSTEMS:   ROS:  CONSTITUTIONAL:  Denies any unusual weight gain/loss, fever, chills, weakness or fatigue.  CARDIOVASCULAR:  Denies chest pain, chest pressure or chest discomfort. No palpitations or edema.  Denies any dyspnea on exertion or at rest  RESPIRATORY:  Denies shortness of breath, cough  GASTROINTESTINAL:  Denies any abdominal pain, nausea, vomiting  NEUROLOGICAL:  Denies headache, dizziness, syncope, numbness or tingling in the extremities.  MUSCULOSKELETAL:  Denies muscle, back pain, joint pain or stiffness.    EXAM:   /74   Pulse 83   Temp 97.3 °F (36.3 °C) (Temporal)   Resp 18   Ht 5' 7\" (1.702 m)   Wt (!) 300 lb 12.8 oz (136.4 kg)   LMP 05/21/2025 (Exact Date)   SpO2 96%   BMI 47.11 kg/m²  Estimated body mass index is 47.11  kg/m² as calculated from the following:    Height as of this encounter: 5' 7\" (1.702 m).    Weight as of this encounter: 300 lb 12.8 oz (136.4 kg).   Vital signs reviewed.Appears stated age, well groomed.  Physical Exam:  GEN:  Patient is alert and oriented x3, no apparent distress  HEAD:  Normocephalic, atraumatic  LUNGS: clear to auscultation bilaterally, no rales/rhonchi/wheezing  HEART:  Regular rate and rhythm, no murmurs, rubs or gallops  ABDOMEN:  Soft, nondistended, nontender, bowel sounds normal in all 4 quadrants, no hepatosplenomegaly  EXTREMITIES:  Strength intact with 5/5 bilaterally upper and lower extremities, no edema noted  NEURO:  CN 2 - 12 grossly intact     ASSESSMENT AND PLAN:   1. Essential hypertension with goal blood pressure less than 130/80  -  controlled  -  cpm  -  check renal function  - NIFEdipine ER 90 MG Oral Tablet 24 Hr; Take 1 tablet (90 mg total) by mouth daily.  Dispense: 90 tablet; Refill: 1  - Comp Metabolic Panel (14) [E]; Future    2. Mixed hyperlipidemia  -  recheck lipid panel  -  ultrafast heart scan in 2024 with calcium score 0  -  consider statin therapy if LDL remains > 160  - Comp Metabolic Panel (14) [E]; Future  - Lipid Panel [E]; Future    3. Morbid obesity (HCC)  -  recommend GLP-1 agonist and discussed possible side effects along with titration schedule  -  check with insurance for coverage  -  recommend lower fat diet and exercise      Meds & Refills for this Visit:  Requested Prescriptions     Signed Prescriptions Disp Refills    NIFEdipine ER 90 MG Oral Tablet 24 Hr 90 tablet 1     Sig: Take 1 tablet (90 mg total) by mouth daily.       Health Maintenance:  Health Maintenance Due   Topic Date Due    Pneumococcal Vaccine: Birth to 50yrs (1 of 2 - PCV) Never done    DTaP,Tdap,and Td Vaccines (1 - Tdap) Never done    COVID-19 Vaccine (4 - 2024-25 season) 09/01/2024    Annual Depression Screening  01/01/2025    Tobacco Cessation Counseling  Never done    Mammogram   07/17/2025    Pap Smear  09/12/2025       Patient/Caregiver Education: Patient/Caregiver Education: There are no barriers to learning. Medical education done.   Outcome: Patient verbalizes understanding. Patient is notified to call with any questions, complications, allergies, or worsening or changing symptoms.  Patient is to call with any side effects or complications from the treatments as a result of today.     Problem List:  Problem List[7]    ROWDY CHUNG DO    Please note that portions of this note may have been completed with a voice recognition program. Efforts were made to edit the dictations but occasionally words are mis-transcribed.         [1]   Past Medical History:   Anemia    BRCA gene mutation negative in female    Negative 48 gene hereditary cancer panel, x1 VUS, report scanned into media tab    Essential hypertension    High blood pressure    Pleurisy    Sleep apnea    used CPAP before losing weight, no longer needs it    Visual impairment    glasses   [2]   Past Surgical History:  Procedure Laterality Date    Colonoscopy N/A 11/25/2024    Procedure: COLONOSCOPY with cold snare polypectomy;  Surgeon: Gibran Reinoso DO;  Location:  ENDOSCOPY    Other surgical history      cervical polyp at the age of 30s    Other surgical history      fibroids    [place] teeth removed     [3]   Social History  Socioeconomic History    Marital status:    Tobacco Use    Smoking status: Every Day     Types: Cigarettes    Smokeless tobacco: Never    Tobacco comments:     3-5 cigarettes per day   Vaping Use    Vaping status: Never Used   Substance and Sexual Activity    Alcohol use: Yes     Comment: once every few months    Drug use: Never    Sexual activity: Yes     Partners: Male   [4]   Family History  Problem Relation Age of Onset    Cancer Mother         liver cancer dx age 62    Diabetes Mother     Other (TVH) Mother 35    Other (Hep C pos.) Mother     Diabetes Father     Hypertension Father      Skin cancer Paternal Grandmother         dx age 60s    Breast Cancer Maternal Aunt 60    Breast Cancer Other 39        cousin    Ovarian Cancer Neg     Uterine Cancer Neg     Pancreatic Cancer Neg     Colon Cancer Neg     Prostate Cancer Neg     Endometriosis Neg     Infertility Neg    [5]   Allergies  Allergen Reactions    Penicillins HIVES     HIVES AS A CHILD   [6]   Current Outpatient Medications   Medication Sig Dispense Refill    NIFEdipine ER 90 MG Oral Tablet 24 Hr Take 1 tablet (90 mg total) by mouth daily. 90 tablet 1    PEG 3350-KCl-NaBcb-NaCl-NaSulf (PEG-3350/ELECTROLYTES) 236 g Oral Recon Soln Take as directed by physician 4000 mL 0    NON FORMULARY Pumpkin seed oil 2 capsule po daily  Saw palmetto 1 tablet po daily  Spearmint leaf 1 tablet po daily  Black pepper /turmeric 1 tablet po daily      magnesium 250 MG Oral Tab Take 1 tablet (250 mg total) by mouth daily as needed.      cholecalciferol 50 MCG (2000 UT) Oral Cap Take 1 capsule (2,000 Units total) by mouth daily.      ibuprofen 600 MG Oral Tab Take 1 tablet (600 mg total) by mouth every 6 (six) hours as needed for Pain. For first 5 days of menstrual cycle      Fluocinolone Acetonide Scalp 0.01 % External Oil as needed.      ketoconazole 2 % External Cream Apply topically daily as needed.      ketoconazole 2 % External Shampoo Apply topically as needed.     [7]   Patient Active Problem List  Diagnosis    Body mass index (BMI) 40.0-44.9, adult (HCC)    Essential hypertension with goal blood pressure less than 130/80    Status post hysteroscopy    Fibroids, submucosal    Menorrhagia with regular cycle    BRCA gene mutation negative in female    Mixed hyperlipidemia    Morbid obesity (HCC)

## 2025-05-22 NOTE — PROGRESS NOTES
The following individual(s) verbally consented to be recorded using ambient AI listening technology and understand that they can each withdraw their consent to this listening technology at any point by asking the clinician to turn off or pause the recording:    Patient name: Lita Hernandes  Additional names:

## 2025-08-21 RX ORDER — ATORVASTATIN CALCIUM 10 MG/1
10 TABLET, FILM COATED ORAL NIGHTLY
Qty: 90 TABLET | Refills: 0 | Status: SHIPPED | OUTPATIENT
Start: 2025-08-21 | End: 2025-08-25

## 2025-08-22 ENCOUNTER — LAB ENCOUNTER (OUTPATIENT)
Dept: LAB | Age: 45
End: 2025-08-22
Attending: FAMILY MEDICINE

## 2025-08-22 ENCOUNTER — OFFICE VISIT (OUTPATIENT)
Dept: FAMILY MEDICINE CLINIC | Facility: CLINIC | Age: 45
End: 2025-08-22

## 2025-08-22 VITALS
OXYGEN SATURATION: 98 % | WEIGHT: 293 LBS | SYSTOLIC BLOOD PRESSURE: 122 MMHG | TEMPERATURE: 98 F | HEART RATE: 96 BPM | RESPIRATION RATE: 16 BRPM | BODY MASS INDEX: 45.99 KG/M2 | HEIGHT: 67 IN | DIASTOLIC BLOOD PRESSURE: 80 MMHG

## 2025-08-22 DIAGNOSIS — E78.2 MIXED HYPERLIPIDEMIA: ICD-10-CM

## 2025-08-22 DIAGNOSIS — J22 ACUTE LOWER RESPIRATORY INFECTION: Primary | ICD-10-CM

## 2025-08-22 DIAGNOSIS — E66.01 MORBID OBESITY WITH BMI OF 45.0-49.9, ADULT (HCC): ICD-10-CM

## 2025-08-22 DIAGNOSIS — Z12.31 ENCOUNTER FOR SCREENING MAMMOGRAM FOR MALIGNANT NEOPLASM OF BREAST: ICD-10-CM

## 2025-08-22 DIAGNOSIS — G47.33 OSA (OBSTRUCTIVE SLEEP APNEA): ICD-10-CM

## 2025-08-22 LAB
ALBUMIN SERPL-MCNC: 4.6 G/DL (ref 3.2–4.8)
ALBUMIN/GLOB SERPL: 1.2 (ref 1–2)
ALP LIVER SERPL-CCNC: 68 U/L (ref 39–100)
ALT SERPL-CCNC: 22 U/L (ref 10–49)
ANION GAP SERPL CALC-SCNC: 12 MMOL/L (ref 0–18)
AST SERPL-CCNC: 25 U/L (ref ?–34)
BILIRUB SERPL-MCNC: 0.5 MG/DL (ref 0.3–1.2)
BUN BLD-MCNC: 10 MG/DL (ref 9–23)
CALCIUM BLD-MCNC: 10.2 MG/DL (ref 8.7–10.6)
CHLORIDE SERPL-SCNC: 105 MMOL/L (ref 98–112)
CHOLEST SERPL-MCNC: 190 MG/DL (ref ?–200)
CO2 SERPL-SCNC: 22 MMOL/L (ref 21–32)
CREAT BLD-MCNC: 1.02 MG/DL (ref 0.55–1.02)
EGFRCR SERPLBLD CKD-EPI 2021: 69 ML/MIN/1.73M2 (ref 60–?)
FASTING PATIENT LIPID ANSWER: YES
FASTING STATUS PATIENT QL REPORTED: YES
GLOBULIN PLAS-MCNC: 3.7 G/DL (ref 2–3.5)
GLUCOSE BLD-MCNC: 94 MG/DL (ref 70–99)
HDLC SERPL-MCNC: 59 MG/DL (ref 40–59)
LDLC SERPL CALC-MCNC: 116 MG/DL (ref ?–100)
NONHDLC SERPL-MCNC: 131 MG/DL (ref ?–130)
OSMOLALITY SERPL CALC.SUM OF ELEC: 287 MOSM/KG (ref 275–295)
POTASSIUM SERPL-SCNC: 3.9 MMOL/L (ref 3.5–5.1)
PROT SERPL-MCNC: 8.3 G/DL (ref 5.7–8.2)
SODIUM SERPL-SCNC: 139 MMOL/L (ref 136–145)
TRIGL SERPL-MCNC: 82 MG/DL (ref 30–149)
VLDLC SERPL CALC-MCNC: 14 MG/DL (ref 0–30)

## 2025-08-22 PROCEDURE — 80053 COMPREHEN METABOLIC PANEL: CPT

## 2025-08-22 PROCEDURE — G2211 COMPLEX E/M VISIT ADD ON: HCPCS | Performed by: FAMILY MEDICINE

## 2025-08-22 PROCEDURE — 99214 OFFICE O/P EST MOD 30 MIN: CPT | Performed by: FAMILY MEDICINE

## 2025-08-22 PROCEDURE — 3079F DIAST BP 80-89 MM HG: CPT | Performed by: FAMILY MEDICINE

## 2025-08-22 PROCEDURE — 80061 LIPID PANEL: CPT

## 2025-08-22 PROCEDURE — 3008F BODY MASS INDEX DOCD: CPT | Performed by: FAMILY MEDICINE

## 2025-08-22 PROCEDURE — 3074F SYST BP LT 130 MM HG: CPT | Performed by: FAMILY MEDICINE

## 2025-08-22 PROCEDURE — 36415 COLL VENOUS BLD VENIPUNCTURE: CPT

## 2025-08-22 RX ORDER — MULTIVITAMIN WITH IRON
50 TABLET ORAL DAILY
COMMUNITY

## 2025-08-22 RX ORDER — AZITHROMYCIN 250 MG/1
TABLET, FILM COATED ORAL
Qty: 6 TABLET | Refills: 0 | Status: SHIPPED | OUTPATIENT
Start: 2025-08-22 | End: 2025-08-27

## 2025-08-22 RX ORDER — TIRZEPATIDE 2.5 MG/.5ML
2.5 INJECTION, SOLUTION SUBCUTANEOUS WEEKLY
Qty: 2 ML | Refills: 0 | Status: SHIPPED | OUTPATIENT
Start: 2025-08-22 | End: 2025-09-13

## 2025-08-22 RX ORDER — PREDNISONE 20 MG/1
40 TABLET ORAL DAILY
Qty: 10 TABLET | Refills: 0 | Status: SHIPPED | OUTPATIENT
Start: 2025-08-22 | End: 2025-08-27

## 2025-08-22 RX ORDER — LECITHIN 1000 MG
TABLET,CHEWABLE ORAL
COMMUNITY

## 2025-08-30 ENCOUNTER — HOSPITAL ENCOUNTER (OUTPATIENT)
Dept: MAMMOGRAPHY | Age: 45
Discharge: HOME | End: 2025-08-30
Attending: FAMILY MEDICINE

## 2025-08-30 DIAGNOSIS — Z12.31 ENCOUNTER FOR SCREENING MAMMOGRAM FOR MALIGNANT NEOPLASM OF BREAST: ICD-10-CM

## 2025-08-30 PROCEDURE — 77067 SCR MAMMO BI INCL CAD: CPT | Performed by: FAMILY MEDICINE

## 2025-08-30 PROCEDURE — 77063 BREAST TOMOSYNTHESIS BI: CPT | Performed by: FAMILY MEDICINE

## (undated) DEVICE — SLEEVE KENDALL SCD EXPRESS MED

## (undated) DEVICE — MEDI-VAC NON-CONDUCTIVE SUCTION TUBING: Brand: CARDINAL HEALTH

## (undated) DEVICE — SOLUTION IRRIG 1000ML 0.9% NACL USP BTL

## (undated) DEVICE — KIT VLV 5 PC AIR H2O SUCT BX ENDOGATOR CONN

## (undated) DEVICE — PTFE COATED BLADE 2.75': Brand: MEDLINE

## (undated) DEVICE — Device

## (undated) DEVICE — V2 SPECIMEN COLLECTION MANIFOLD KIT: Brand: NEPTUNE

## (undated) DEVICE — 2000CC GUARDIAN II: Brand: GUARDIAN

## (undated) DEVICE — VIOLET BRAIDED (POLYGLACTIN 910), SYNTHETIC ABSORBABLE SUTURE: Brand: COATED VICRYL

## (undated) DEVICE — DEV REMOVAL TRUCLEAR SFT MINI

## (undated) DEVICE — 1200CC GUARDIAN II: Brand: GUARDIAN

## (undated) DEVICE — SUTURE ETHLN SZ 2-0 L18IN NONABSORB BLK

## (undated) DEVICE — 3M™ RED DOT™ MONITORING ELECTRODE WITH FOAM TAPE AND STICKY GEL 2570-3, 3/BAG, 200/CASE, 54/PLT: Brand: RED DOT™

## (undated) DEVICE — LASSO POLYPECTOMY SNARE: Brand: LASSO

## (undated) DEVICE — KIT CUSTOM ENDOPROCEDURE STERIS

## (undated) DEVICE — OUTFLOW HYSTER S&N

## (undated) DEVICE — STERILE POLYISOPRENE POWDER-FREE SURGICAL GLOVES: Brand: PROTEXIS

## (undated) DEVICE — SPECIMEN SOCK - STANDARD: Brand: MEDI-VAC

## (undated) DEVICE — SOL NACL IRRIG 0.9% 1000ML BTL

## (undated) DEVICE — NEEDLE SPINAL 22X3-1/2 BLK

## (undated) DEVICE — INFLOWHYSTER S&N

## (undated) DEVICE — 10FT COMBINED O2 DELIVERY/CO2 MONITORING. FILTER WITH MICROSTREAM TYPE LUER: Brand: DUAL ADULT NASAL CANNULA

## (undated) DEVICE — MINI LAP PACK-LF: Brand: MEDLINE INDUSTRIES, INC.

## (undated) DEVICE — SLEEVE COMPR M KNEE LEN SGL USE KENDALL SCD

## (undated) DEVICE — PAD,ABDOMINAL,8"X7.5",ST,LF,20/BX: Brand: MEDLINE INDUSTRIES, INC.

## (undated) DEVICE — OINTMENT SKIN 3 ANTIBIO BACI ZN NEOMYCN SULF

## (undated) DEVICE — SPONGE GZ W4XL8IN COT 12 PLY WVN

## (undated) DEVICE — PREMIUM WET SKIN PREP TRAY: Brand: MEDLINE INDUSTRIES, INC.

## (undated) DEVICE — SOLUTION  .9 3000ML

## (undated) DEVICE — SEAL TRUCLEAR  HYSTERSCOP

## (undated) DEVICE — DEVICE TRUCLEAR ULTRA MINI

## (undated) DEVICE — GYN CDS: Brand: MEDLINE INDUSTRIES, INC.

## (undated) NOTE — Clinical Note
I had the pleasure of seeing Lianet Santo on 11/7/2022. Please see my attached note.   Geeta Craig MD FACS [facility]--Surgery

## (undated) NOTE — LETTER
[facility] Department of OB/GYN  After Care Instructions for Endometrial Biopsy      Biopsy Results   You will receive a phone call with your biopsy results in 7 business days. If you have not received your results in 7 days, please contact our office. The results of your biopsy will determine if further treatment will be necessary. Bleeding   You may have some light bleeding or blackish clumpy discharge for several days after your biopsy. Restrictions    You should avoid intercourse or tampon use for 1 day after your biopsy. Pain    You may experience mild menstrual cramping after your biopsy. You may use Ibuprofen, Aleve or Tylenol to relieve your discomfort. If you experience severe or persistent pain contact our office. If you have any additional questions, please call us at [phone].